# Patient Record
Sex: FEMALE | Race: WHITE | NOT HISPANIC OR LATINO | Employment: UNEMPLOYED | URBAN - METROPOLITAN AREA
[De-identification: names, ages, dates, MRNs, and addresses within clinical notes are randomized per-mention and may not be internally consistent; named-entity substitution may affect disease eponyms.]

---

## 2017-06-15 ENCOUNTER — HISTORICAL (OUTPATIENT)
Dept: ADMINISTRATIVE | Facility: HOSPITAL | Age: 27
End: 2017-06-15

## 2017-06-15 LAB
ABS NEUT (OLG): 6.39 X10(3)/MCL (ref 2.1–9.2)
ALBUMIN SERPL-MCNC: 4.1 GM/DL (ref 3.4–5)
ALBUMIN/GLOB SERPL: 1 {RATIO}
ALP SERPL-CCNC: 69 UNIT/L (ref 20–120)
ALT SERPL-CCNC: 15 UNIT/L
AST SERPL-CCNC: 15 UNIT/L
BASOPHILS # BLD AUTO: 0.06 X10(3)/MCL
BASOPHILS NFR BLD AUTO: 1 % (ref 0–1)
BILIRUB SERPL-MCNC: 0.5 MG/DL
BILIRUBIN DIRECT+TOT PNL SERPL-MCNC: <0.1 MG/DL
BILIRUBIN DIRECT+TOT PNL SERPL-MCNC: >0.4 MG/DL
BUN SERPL-MCNC: 10 MG/DL (ref 7–25)
CALCIUM SERPL-MCNC: 9.1 MG/DL (ref 8.4–10.3)
CHLORIDE SERPL-SCNC: 103 MMOL/L (ref 96–110)
CO2 SERPL-SCNC: 28 MMOL/L (ref 24–32)
CREAT SERPL-MCNC: 0.45 MG/DL (ref 0.7–1.1)
EOSINOPHIL # BLD AUTO: 0.13 X10(3)/MCL
EOSINOPHIL NFR BLD AUTO: 1 % (ref 0–5)
ERYTHROCYTE [DISTWIDTH] IN BLOOD BY AUTOMATED COUNT: 12.6 % (ref 11.5–14.5)
EST. AVERAGE GLUCOSE BLD GHB EST-MCNC: 94 MG/DL
GLOBULIN SER-MCNC: 3.1 GM/ML (ref 2.3–3.5)
GLUCOSE SERPL-MCNC: 86 MG/DL (ref 65–99)
HBA1C MFR BLD: 4.9 % (ref 4.7–5.6)
HCT VFR BLD AUTO: 37.9 % (ref 35–46)
HGB BLD-MCNC: 12.3 GM/DL (ref 12–16)
IMM GRANULOCYTES # BLD AUTO: 0.03 10*3/UL
IMM GRANULOCYTES NFR BLD AUTO: 0 %
LYMPHOCYTES # BLD AUTO: 2.88 X10(3)/MCL
LYMPHOCYTES NFR BLD AUTO: 28 % (ref 15–40)
MCH RBC QN AUTO: 27.1 PG (ref 26–34)
MCHC RBC AUTO-ENTMCNC: 32.5 GM/DL (ref 31–37)
MCV RBC AUTO: 83.5 FL (ref 80–100)
MONOCYTES # BLD AUTO: 0.74 X10(3)/MCL
MONOCYTES NFR BLD AUTO: 7 % (ref 4–12)
NEUTROPHILS # BLD AUTO: 6.39 X10(3)/MCL
NEUTROPHILS NFR BLD AUTO: 62 X10(3)/MCL
PLATELET # BLD AUTO: 362 X10(3)/MCL (ref 130–400)
PMV BLD AUTO: 10.6 FL (ref 7.4–10.4)
POTASSIUM SERPL-SCNC: 4 MMOL/L (ref 3.6–5.2)
PROT SERPL-MCNC: 7.2 GM/DL (ref 6–8)
RBC # BLD AUTO: 4.54 X10(6)/MCL (ref 4–5.2)
SODIUM SERPL-SCNC: 139 MMOL/L (ref 135–146)
T4 FREE SERPL-MCNC: 0.72 NG/DL (ref 0.6–1.15)
TSH SERPL-ACNC: 1.93 MIU/L (ref 0.5–5)
WBC # SPEC AUTO: 10.2 X10(3)/MCL (ref 4.5–11)

## 2017-08-07 LAB — POC BETA-HCG (QUAL): NEGATIVE

## 2017-08-18 ENCOUNTER — HISTORICAL (OUTPATIENT)
Dept: RADIOLOGY | Facility: HOSPITAL | Age: 27
End: 2017-08-18

## 2017-11-02 LAB — POC BETA-HCG (QUAL): NEGATIVE

## 2017-11-09 ENCOUNTER — HISTORICAL (OUTPATIENT)
Dept: ADMINISTRATIVE | Facility: HOSPITAL | Age: 27
End: 2017-11-09

## 2017-11-11 LAB — FINAL CULTURE: NORMAL

## 2017-12-14 LAB — POC BETA-HCG (QUAL): NEGATIVE

## 2019-09-24 LAB — POC BETA-HCG (QUAL): NEGATIVE

## 2019-09-27 ENCOUNTER — HISTORICAL (OUTPATIENT)
Dept: RADIOLOGY | Facility: HOSPITAL | Age: 29
End: 2019-09-27

## 2020-07-02 LAB — POC BETA-HCG (QUAL): NEGATIVE

## 2020-09-30 LAB — POC BETA-HCG (QUAL): NEGATIVE

## 2020-10-07 ENCOUNTER — HISTORICAL (OUTPATIENT)
Dept: RADIOLOGY | Facility: HOSPITAL | Age: 30
End: 2020-10-07

## 2021-06-23 LAB — POC BETA-HCG (QUAL): NEGATIVE

## 2021-10-12 LAB — POC BETA-HCG (QUAL): NEGATIVE

## 2021-10-14 LAB — POC BETA-HCG (QUAL): NEGATIVE

## 2021-11-02 ENCOUNTER — HISTORICAL (OUTPATIENT)
Dept: ADMINISTRATIVE | Facility: HOSPITAL | Age: 31
End: 2021-11-02

## 2021-11-02 LAB
ALBUMIN SERPL-MCNC: 3.6 GM/DL (ref 3.5–5)
ALBUMIN/GLOB SERPL: 1.5 RATIO (ref 1.1–2)
ALP SERPL-CCNC: 67 UNIT/L (ref 40–150)
ALT SERPL-CCNC: 16 UNIT/L (ref 0–55)
AST SERPL-CCNC: 15 UNIT/L (ref 5–34)
B-HCG SERPL QL: NEGATIVE
BILIRUB SERPL-MCNC: 0.5 MG/DL
BILIRUBIN DIRECT+TOT PNL SERPL-MCNC: 0.2 MG/DL (ref 0–0.5)
BILIRUBIN DIRECT+TOT PNL SERPL-MCNC: 0.3 MG/DL (ref 0–0.8)
BUN SERPL-MCNC: 12 MG/DL (ref 7–18.7)
CALCIUM SERPL-MCNC: 8.9 MG/DL (ref 8.7–10.5)
CHLORIDE SERPL-SCNC: 103 MMOL/L (ref 98–107)
CO2 SERPL-SCNC: 27 MMOL/L (ref 22–29)
CREAT SERPL-MCNC: 0.74 MG/DL (ref 0.55–1.02)
ERYTHROCYTE [DISTWIDTH] IN BLOOD BY AUTOMATED COUNT: 12.1 % (ref 11.5–17)
GLOBULIN SER-MCNC: 2.4 GM/DL (ref 2.4–3.5)
GLUCOSE SERPL-MCNC: 80 MG/DL (ref 74–100)
GROUP & RH: NORMAL
HCT VFR BLD AUTO: 38.5 % (ref 37–47)
HGB BLD-MCNC: 12.3 GM/DL (ref 12–16)
MCH RBC QN AUTO: 28.1 PG (ref 27–31)
MCHC RBC AUTO-ENTMCNC: 31.9 GM/DL (ref 33–36)
MCV RBC AUTO: 88.1 FL (ref 80–94)
PLATELET # BLD AUTO: 311 X10(3)/MCL (ref 130–400)
PMV BLD AUTO: 10.4 FL (ref 9.4–12.4)
POTASSIUM SERPL-SCNC: 4.3 MMOL/L (ref 3.5–5.1)
PROT SERPL-MCNC: 6 GM/DL (ref 6.4–8.3)
RBC # BLD AUTO: 4.37 X10(6)/MCL (ref 4.2–5.4)
SARS-COV-2 RNA RESP QL NAA+PROBE: NOT DETECTED
SODIUM SERPL-SCNC: 140 MMOL/L (ref 136–145)
TSH SERPL-ACNC: 1.66 UIU/ML (ref 0.35–4.94)
WBC # SPEC AUTO: 9.7 X10(3)/MCL (ref 4.5–11.5)

## 2021-11-04 ENCOUNTER — HOSPITAL ENCOUNTER (OUTPATIENT)
Dept: MEDSURG UNIT | Facility: HOSPITAL | Age: 31
End: 2021-11-06
Attending: OBSTETRICS & GYNECOLOGY | Admitting: OBSTETRICS & GYNECOLOGY

## 2021-11-05 LAB
HCT VFR BLD AUTO: 34.4 % (ref 37–47)
HGB BLD-MCNC: 10.9 GM/DL (ref 12–16)

## 2022-04-10 ENCOUNTER — HISTORICAL (OUTPATIENT)
Dept: ADMINISTRATIVE | Facility: HOSPITAL | Age: 32
End: 2022-04-10

## 2022-04-27 VITALS
HEIGHT: 62 IN | SYSTOLIC BLOOD PRESSURE: 110 MMHG | DIASTOLIC BLOOD PRESSURE: 69 MMHG | WEIGHT: 140 LBS | BODY MASS INDEX: 25.76 KG/M2

## 2022-04-30 NOTE — OP NOTE
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    OPERATION:  Total abdominal hysterectomy.    ANESTHESIOLOGIST:  Brown Pacheco MD; __________, CRNA.    ESTIMATED BLOOD LOSS:  About 50 cc.    FLUIDS:  Crystalloid 1.5 L.    ANTIBIOTICS:  Ancef 2 g.    PREGNANCY TEST:  Negative.    FINDINGS:  9-week size, smooth, mobile, adenomyosis appearing uterus, normal adnexa bilaterally, freely mobile bowel contents, smooth peritoneal surfaces.  SCDs were placed.    DESCRIPTION OF PROCEDURE:  Risks, benefits, and alternatives to this procedure were explained in detail to Ms Clancy.  She verbalized understanding.  Consents were signed.  She was taken to the operating theater with intravenous fluids running and placed on the operating room table in dorsal supine position where general anesthesia achieved without difficulty.  Jesus catheter placed.  She was prepped and draped in usual sterile fashion.  A Pfannenstiel skin incision made through an old Pfannenstiel scar line, carried down to the underlying layer of fascia sharply.  Fascia scored in the midline.  Fascial incision extended bilaterally sharply.  Superior aspect of the fascial incision grasped with [facility] clamps x2, and underlying rectus muscle dissected off sharply.  Inferior aspect of fascial incision grasped with [facility] clamps x2 and underlying rectus muscles dissected off sharply.  Rectus  in midline.  Parietal peritoneum identified, tented up, was entered sharply extended inferiorly, superiorly.  Patient placed in Trendelenburg position.  O'Skyler-O'Fox retractor placed.  Bowel packed with moist laparotomy sponges.   Uterus grasped at the cornu with [facility] clamps for traction.  Left round ligament was then grasped with Kocher clamps, fulgurated with the Bovie.  Zero Vicryl pop offs were used for all sutures.  It was then suture ligated, hemostasis noted.  In similar fashion, the right round ligament was clamped, fulgurated, and suture ligated.  Hemostasis noted.  A window was then created in the left utero-ovarian ligament through the broad ligament.  The left utero-ovarian ligament was doubly clamped, transected, free tied and suture ligated with 0 Vicryl suture __________ hemostasis noted.  The right utero-ovarian ligament was then doubly clamped, transected, suture ligated, free tied and then suture ligated in a __________ hemostasis noted.  Anterior leaf of broad ligament was brought off the uterus sharply with Metzenbaum scissors.  Uterine arteries were then skeletonized bilaterally, clamped with Demond clamps, transected and suture ligated, hemostasis noted.  I then proceeded to work my way with straight Demond clamps transecting and suture ligated along the way the uterosacral ligaments, followed by the cardinal ligaments.  Upon getting to the level of the external cervical os, came across with curved Demond clamps, removing the uterus and cervix at this time.  The angles of the vagina were incorporated with 0 Vicryl suture into uterosacral ligament pedicles.  The remainder of the vaginal cuff was closed with 0 Vicryl sutures figure-of-eight fashion.  Hemostasis noted.  The pelvis was then irrigated with copious amounts of irrigation.  Utero-ovarian ligament pedicles were tacked to the round ligament pedicles to suspend the ovaries out of the pelvis.  Hemostasis was noted bilaterally, as well as on the vaginal cuff.  We irrigated once more.  Hemostasis noted.  O'Skyler-O'Fox retractor removed, laparotomy sponges were removed.  Bowel returned to its anatomical position.  Rectus muscle reapproximated with 2-0 Vicryl  suture in interrupted fashion.  Fascia reapproximated with #1 Vicryl suture in running fashion.  Hemostasis noted.  Subcu irrigated, reapproximated with 2-0 Vicryl suture in interrupted fashion.  Skin reapproximated with the Insorb subcuticular staple device, followed by Dermabond glue.  Sponge, lap, instrument and needle counts correct x2.  Time was taken to speak with her .        ______________________________  Alec Kapadia MD    VIOLA/  DD:  11/04/2021  Time:  01:33PM  DT:  11/04/2021  Time:  01:51PM  Job #:  [id number]

## 2022-09-15 ENCOUNTER — OFFICE VISIT (OUTPATIENT)
Dept: FAMILY MEDICINE | Facility: CLINIC | Age: 32
End: 2022-09-15
Payer: MEDICAID

## 2022-09-15 VITALS
WEIGHT: 136.5 LBS | BODY MASS INDEX: 25.12 KG/M2 | HEART RATE: 66 BPM | DIASTOLIC BLOOD PRESSURE: 73 MMHG | SYSTOLIC BLOOD PRESSURE: 109 MMHG | RESPIRATION RATE: 20 BRPM | HEIGHT: 62 IN | TEMPERATURE: 98 F | OXYGEN SATURATION: 99 %

## 2022-09-15 DIAGNOSIS — Z00.00 ENCOUNTER FOR WELLNESS EXAMINATION: Primary | ICD-10-CM

## 2022-09-15 DIAGNOSIS — F41.9 ANXIETY: ICD-10-CM

## 2022-09-15 DIAGNOSIS — M54.50 CHRONIC BILATERAL LOW BACK PAIN, UNSPECIFIED WHETHER SCIATICA PRESENT: Chronic | ICD-10-CM

## 2022-09-15 DIAGNOSIS — G89.29 CHRONIC BILATERAL LOW BACK PAIN, UNSPECIFIED WHETHER SCIATICA PRESENT: Chronic | ICD-10-CM

## 2022-09-15 LAB
ALBUMIN SERPL-MCNC: 4.3 GM/DL (ref 3.5–5)
ALBUMIN/GLOB SERPL: 1.6 RATIO (ref 1.1–2)
ALP SERPL-CCNC: 64 UNIT/L (ref 40–150)
ALT SERPL-CCNC: 19 UNIT/L (ref 0–55)
AMORPH URATE CRY URNS QL MICRO: ABNORMAL /UL
APPEARANCE UR: ABNORMAL
AST SERPL-CCNC: 18 UNIT/L (ref 5–34)
BACTERIA #/AREA URNS AUTO: ABNORMAL /HPF
BASOPHILS # BLD AUTO: 0.06 X10(3)/MCL (ref 0–0.2)
BASOPHILS NFR BLD AUTO: 0.9 %
BILIRUB UR QL STRIP.AUTO: NEGATIVE MG/DL
BILIRUBIN DIRECT+TOT PNL SERPL-MCNC: 0.7 MG/DL
BUN SERPL-MCNC: 12.2 MG/DL (ref 7–18.7)
CALCIUM SERPL-MCNC: 9.3 MG/DL (ref 8.4–10.2)
CHLORIDE SERPL-SCNC: 106 MMOL/L (ref 98–107)
CHOLEST SERPL-MCNC: 162 MG/DL
CHOLEST/HDLC SERPL: 4 {RATIO} (ref 0–5)
CO2 SERPL-SCNC: 25 MMOL/L (ref 22–29)
COLOR UR AUTO: YELLOW
CREAT SERPL-MCNC: 0.76 MG/DL (ref 0.55–1.02)
EOSINOPHIL # BLD AUTO: 0.08 X10(3)/MCL (ref 0–0.9)
EOSINOPHIL NFR BLD AUTO: 1.2 %
ERYTHROCYTE [DISTWIDTH] IN BLOOD BY AUTOMATED COUNT: 11.6 % (ref 11.5–17)
EST. AVERAGE GLUCOSE BLD GHB EST-MCNC: 88.2 MG/DL
GFR SERPLBLD CREATININE-BSD FMLA CKD-EPI: >60 MLS/MIN/1.73/M2
GLOBULIN SER-MCNC: 2.7 GM/DL (ref 2.4–3.5)
GLUCOSE SERPL-MCNC: 68 MG/DL (ref 74–100)
GLUCOSE UR QL STRIP.AUTO: NEGATIVE MG/DL
HAV IGM SERPL QL IA: NONREACTIVE
HBA1C MFR BLD: 4.7 %
HBV CORE IGM SERPL QL IA: NONREACTIVE
HBV SURFACE AG SERPL QL IA: NONREACTIVE
HCT VFR BLD AUTO: 43.4 % (ref 37–47)
HCV AB SERPL QL IA: NONREACTIVE
HDLC SERPL-MCNC: 40 MG/DL (ref 35–60)
HGB BLD-MCNC: 14.2 GM/DL (ref 12–16)
HIV 1+2 AB+HIV1 P24 AG SERPL QL IA: NONREACTIVE
IMM GRANULOCYTES # BLD AUTO: 0.01 X10(3)/MCL (ref 0–0.04)
IMM GRANULOCYTES NFR BLD AUTO: 0.1 %
KETONES UR QL STRIP.AUTO: NEGATIVE MG/DL
LDLC SERPL CALC-MCNC: 114 MG/DL (ref 50–140)
LEUKOCYTE ESTERASE UR QL STRIP.AUTO: NEGATIVE UNIT/L
LYMPHOCYTES # BLD AUTO: 2.3 X10(3)/MCL (ref 0.6–4.6)
LYMPHOCYTES NFR BLD AUTO: 33.2 %
MCH RBC QN AUTO: 29.1 PG (ref 27–31)
MCHC RBC AUTO-ENTMCNC: 32.7 MG/DL (ref 33–36)
MCV RBC AUTO: 88.9 FL (ref 80–94)
MONOCYTES # BLD AUTO: 0.55 X10(3)/MCL (ref 0.1–1.3)
MONOCYTES NFR BLD AUTO: 7.9 %
MUCOUS THREADS URNS QL MICRO: ABNORMAL /LPF
NEUTROPHILS # BLD AUTO: 3.9 X10(3)/MCL (ref 2.1–9.2)
NEUTROPHILS NFR BLD AUTO: 56.7 %
NITRITE UR QL STRIP.AUTO: NEGATIVE
NRBC BLD AUTO-RTO: 0 %
PH UR STRIP.AUTO: 6 [PH]
PLATELET # BLD AUTO: 261 X10(3)/MCL (ref 130–400)
PMV BLD AUTO: 10.2 FL (ref 7.4–10.4)
POTASSIUM SERPL-SCNC: 4.9 MMOL/L (ref 3.5–5.1)
PROT SERPL-MCNC: 7 GM/DL (ref 6.4–8.3)
PROT UR QL STRIP.AUTO: ABNORMAL MG/DL
RBC # BLD AUTO: 4.88 X10(6)/MCL (ref 4.2–5.4)
RBC #/AREA URNS AUTO: ABNORMAL /HPF
RBC UR QL AUTO: NEGATIVE UNIT/L
SODIUM SERPL-SCNC: 142 MMOL/L (ref 136–145)
SP GR UR STRIP.AUTO: 1.03
SQUAMOUS #/AREA URNS LPF: ABNORMAL /HPF
T PALLIDUM AB SER QL: NONREACTIVE
T4 FREE SERPL-MCNC: 0.88 NG/DL (ref 0.7–1.48)
TRIGL SERPL-MCNC: 38 MG/DL (ref 37–140)
TSH SERPL-ACNC: 2.54 UIU/ML (ref 0.35–4.94)
UROBILINOGEN UR STRIP-ACNC: NORMAL MG/DL
VLDLC SERPL CALC-MCNC: 8 MG/DL
WBC # SPEC AUTO: 6.9 X10(3)/MCL (ref 4.5–11.5)
WBC #/AREA URNS AUTO: ABNORMAL /HPF

## 2022-09-15 PROCEDURE — 85025 COMPLETE CBC W/AUTO DIFF WBC: CPT

## 2022-09-15 PROCEDURE — 84439 ASSAY OF FREE THYROXINE: CPT

## 2022-09-15 PROCEDURE — 99385 PR PREVENTIVE VISIT,NEW,18-39: ICD-10-PCS | Mod: S$PBB,,,

## 2022-09-15 PROCEDURE — 99214 OFFICE O/P EST MOD 30 MIN: CPT | Mod: PBBFAC,PN

## 2022-09-15 PROCEDURE — 3078F DIAST BP <80 MM HG: CPT | Mod: CPTII,,,

## 2022-09-15 PROCEDURE — 3008F BODY MASS INDEX DOCD: CPT | Mod: CPTII,,,

## 2022-09-15 PROCEDURE — 80074 ACUTE HEPATITIS PANEL: CPT

## 2022-09-15 PROCEDURE — 84443 ASSAY THYROID STIM HORMONE: CPT

## 2022-09-15 PROCEDURE — 3078F PR MOST RECENT DIASTOLIC BLOOD PRESSURE < 80 MM HG: ICD-10-PCS | Mod: CPTII,,,

## 2022-09-15 PROCEDURE — 99214 OFFICE O/P EST MOD 30 MIN: CPT | Mod: 25,S$PBB,,

## 2022-09-15 PROCEDURE — 1159F PR MEDICATION LIST DOCUMENTED IN MEDICAL RECORD: ICD-10-PCS | Mod: CPTII,,,

## 2022-09-15 PROCEDURE — 36415 COLL VENOUS BLD VENIPUNCTURE: CPT

## 2022-09-15 PROCEDURE — 1160F PR REVIEW ALL MEDS BY PRESCRIBER/CLIN PHARMACIST DOCUMENTED: ICD-10-PCS | Mod: CPTII,,,

## 2022-09-15 PROCEDURE — 99385 PREV VISIT NEW AGE 18-39: CPT | Mod: S$PBB,,,

## 2022-09-15 PROCEDURE — 99214 PR OFFICE/OUTPT VISIT, EST, LEVL IV, 30-39 MIN: ICD-10-PCS | Mod: 25,S$PBB,,

## 2022-09-15 PROCEDURE — 1160F RVW MEDS BY RX/DR IN RCRD: CPT | Mod: CPTII,,,

## 2022-09-15 PROCEDURE — 86780 TREPONEMA PALLIDUM: CPT

## 2022-09-15 PROCEDURE — 81001 URINALYSIS AUTO W/SCOPE: CPT

## 2022-09-15 PROCEDURE — 80061 LIPID PANEL: CPT

## 2022-09-15 PROCEDURE — 1159F MED LIST DOCD IN RCRD: CPT | Mod: CPTII,,,

## 2022-09-15 PROCEDURE — 3008F PR BODY MASS INDEX (BMI) DOCUMENTED: ICD-10-PCS | Mod: CPTII,,,

## 2022-09-15 PROCEDURE — 87389 HIV-1 AG W/HIV-1&-2 AB AG IA: CPT

## 2022-09-15 PROCEDURE — 3074F SYST BP LT 130 MM HG: CPT | Mod: CPTII,,,

## 2022-09-15 PROCEDURE — 80053 COMPREHEN METABOLIC PANEL: CPT

## 2022-09-15 PROCEDURE — 3074F PR MOST RECENT SYSTOLIC BLOOD PRESSURE < 130 MM HG: ICD-10-PCS | Mod: CPTII,,,

## 2022-09-15 PROCEDURE — 83036 HEMOGLOBIN GLYCOSYLATED A1C: CPT

## 2022-09-15 RX ORDER — HYDROXYZINE PAMOATE 25 MG/1
25 CAPSULE ORAL EVERY 6 HOURS PRN
Qty: 90 CAPSULE | Refills: 1 | Status: SHIPPED | OUTPATIENT
Start: 2022-09-15 | End: 2022-10-21 | Stop reason: SDUPTHER

## 2022-09-15 NOTE — ASSESSMENT & PLAN NOTE
Given resources to establish care for psychotherapy.   At length discussion about options, patient also be best to trial medication that she can take on as needed based

## 2022-09-15 NOTE — PROGRESS NOTES
Patient Name: Nanci Clancy   : 1990  MRN: [id number]     Subjective:   Patient ID: Nanci Clancy is a 31 y.o. female.    Chief Complaint:   Chief Complaint   Patient presents with    Establish Care     C/O back pain         HPI: 09/15/2022:  Patient here to establish care today, she denies seeing a general practitioner PCP and her daughter alive, she is established with gynecologist who performs her women's wellness for her.  She has follow-up with him beginning of October.  Patient today complains of chronic back pain after accident about 10 years ago, she has tried managing this herself with daily workout routine.  She is requesting further workup to help with her pain in get proper treatment.  Patient denies any bladder or bowel incontinence, saddle anesthesia or tingling of lower extremities.  Patient also complains of increased anxiety and outburst of anger.  Patient states that she is interested in starting psychotherapy to help with these issues but also may need medication to assist.  Patient denies SI/HI.      ROS:  Review of Systems   Constitutional:  Negative for chills, fever and weight loss.   HENT:  Negative for ear discharge, nosebleeds and tinnitus.    Eyes:  Negative for blurred vision, photophobia and pain.   Respiratory:  Negative for cough, shortness of breath, wheezing and stridor.    Cardiovascular:  Negative for chest pain, palpitations and orthopnea.   Gastrointestinal:  Negative for abdominal pain, heartburn and nausea.   Genitourinary:  Negative for dysuria, frequency, hematuria and urgency.   Musculoskeletal:  Positive for back pain. Negative for falls and myalgias.   Skin:  Negative for itching and rash.   Neurological:  Negative for dizziness, sensory change, speech change, focal weakness, seizures, weakness and headaches.   Endo/Heme/Allergies:  Negative for environmental allergies. Does not bruise/bleed easily.   Psychiatric/Behavioral:  Negative for depression,  "hallucinations and suicidal ideas. The patient is nervous/anxious.     History:   History reviewed. No pertinent past medical history.   Past Surgical History:   Procedure Laterality Date    HYSTERECTOMY       History reviewed. No pertinent family history.   Social History     Tobacco Use    Smoking status: Never    Smokeless tobacco: Never   Substance and Sexual Activity    Alcohol use: Not Currently    Drug use: Never    Sexual activity: Not on file        Allergies: Review of patient's allergies indicates:  No Known Allergies  Objective:     Vitals:    09/15/22 0801   BP: 109/73   Pulse: 66   Resp: 20   Temp: 98.4 °F (36.9 °C)   SpO2: 99%   Weight: 61.9 kg (136 lb 8 oz)   Height: 5' 2" (1.575 m)     Body mass index is 24.97 kg/m².     Physical Examination:   Physical Exam  Vitals reviewed.   Constitutional:       Appearance: Normal appearance. She is normal weight.   HENT:      Head: Normocephalic.      Right Ear: Tympanic membrane, ear canal and external ear normal.      Left Ear: Tympanic membrane, ear canal and external ear normal.      Nose: Nose normal.      Mouth/Throat:      Mouth: Mucous membranes are moist.      Pharynx: Oropharynx is clear.   Eyes:      Extraocular Movements: Extraocular movements intact.      Conjunctiva/sclera: Conjunctivae normal.      Pupils: Pupils are equal, round, and reactive to light.   Cardiovascular:      Rate and Rhythm: Normal rate and regular rhythm.      Pulses: Normal pulses.      Heart sounds: Normal heart sounds.   Pulmonary:      Effort: Pulmonary effort is normal.      Breath sounds: Normal breath sounds.   Abdominal:      General: Abdomen is flat. Bowel sounds are normal.      Palpations: Abdomen is soft.   Musculoskeletal:      Cervical back: Normal range of motion and neck supple.      Thoracic back: Spasms present. Decreased range of motion.      Lumbar back: Spasms present. Decreased range of motion.   Skin:     General: Skin is warm and dry.   Neurological:     "  General: No focal deficit present.      Mental Status: She is alert and oriented to person, place, and time.   Psychiatric:         Mood and Affect: Mood normal.         Behavior: Behavior normal.       Assessment:     Problem List Items Addressed This Visit          Psychiatric    Anxiety (Chronic)    Overview     Practice deep breathing or abdominal breathing exercises when anxiety occurs.  Exercise daily. Get sunlight daily.  Avoid caffeine, alcohol and stimulants.  Practice positive phrases and repeat throughout the day, yoga, lavender scents or Chamomile tea will help anxiety.  Set healthy boundaries, avoid people and conversations that increase stress.  Reports any symptoms of suicidal or homicidal ideations immediately, if clinic is closed go to nearest emergency room.             Current Assessment & Plan     Given resources to establish care for psychotherapy.   At length discussion about options, patient also be best to trial medication that she can take on as needed based         Relevant Medications    hydrOXYzine pamoate (VISTARIL) 25 MG Cap       Orthopedic    Chronic low back pain (Chronic)    Overview     Lower back stretches daily.  Avoid strenuous lifting, use proper body mechanics.  Heating pad, Ice pack, Biofreeze or Epsom salt baths as needed.  Exercise to strengthen core muscles to support your back.  PT Referral given.             Relevant Orders    Ambulatory referral/consult to Physical/Occupational Therapy     Other Visit Diagnoses       Encounter for wellness examination    -  Primary    Relevant Orders    TSH    T4, Free    Hemoglobin A1C    SYPHILIS ANTIBODY (WITH REFLEX RPR)    Hepatitis Panel, Acute    Lipid Panel    CBC Auto Differential    Comprehensive Metabolic Panel    HIV 1/2 Ag/Ab (4th Gen)    Urinalysis            Plan:   Nanci was seen today for establish care.    Diagnoses and all orders for this visit:    Encounter for wellness examination  -     TSH  -     T4, Free  -      Hemoglobin A1C  -     SYPHILIS ANTIBODY (WITH REFLEX RPR)  -     Hepatitis Panel, Acute  -     Lipid Panel  -     CBC Auto Differential  -     Comprehensive Metabolic Panel  -     HIV 1/2 Ag/Ab (4th Gen)  -     Urinalysis    Anxiety  -     hydrOXYzine pamoate (VISTARIL) 25 MG Cap; Take 1 capsule (25 mg total) by mouth every 6 (six) hours as needed (anxiety).    Chronic bilateral low back pain, unspecified whether sciatica present  -     Ambulatory referral/consult to Physical/Occupational Therapy; Future     Follow up in about 4 weeks (around 10/13/2022) for Virtual Visit, med change fu, review labs.     This note was created with the assistance of Dragon voice recognition software or phone dictation. There may be transcription errors as a result of using this technology however minimal. Effort has been made to assure accuracy of transcription but any obvious errors or omissions should be clarified with the author of the document

## 2022-09-16 LAB — PATH REV: NORMAL

## 2022-09-21 ENCOUNTER — HISTORICAL (OUTPATIENT)
Dept: ADMINISTRATIVE | Facility: HOSPITAL | Age: 32
End: 2022-09-21
Payer: MEDICAID

## 2022-10-21 ENCOUNTER — OFFICE VISIT (OUTPATIENT)
Dept: FAMILY MEDICINE | Facility: CLINIC | Age: 32
End: 2022-10-21
Payer: MEDICAID

## 2022-10-21 DIAGNOSIS — G89.29 CHRONIC BILATERAL LOW BACK PAIN, UNSPECIFIED WHETHER SCIATICA PRESENT: Chronic | ICD-10-CM

## 2022-10-21 DIAGNOSIS — M54.50 CHRONIC BILATERAL LOW BACK PAIN, UNSPECIFIED WHETHER SCIATICA PRESENT: Chronic | ICD-10-CM

## 2022-10-21 DIAGNOSIS — R14.0 ABDOMINAL BLOATING: ICD-10-CM

## 2022-10-21 DIAGNOSIS — K92.1 BLOOD IN STOOL: ICD-10-CM

## 2022-10-21 DIAGNOSIS — F41.9 ANXIETY: Chronic | ICD-10-CM

## 2022-10-21 DIAGNOSIS — K59.00 CONSTIPATION, UNSPECIFIED CONSTIPATION TYPE: Primary | ICD-10-CM

## 2022-10-21 PROCEDURE — 36415 COLL VENOUS BLD VENIPUNCTURE: CPT

## 2022-10-21 PROCEDURE — 1160F RVW MEDS BY RX/DR IN RCRD: CPT | Mod: CPTII,95,,

## 2022-10-21 PROCEDURE — 1159F MED LIST DOCD IN RCRD: CPT | Mod: CPTII,95,,

## 2022-10-21 PROCEDURE — 99214 OFFICE O/P EST MOD 30 MIN: CPT | Mod: 95,,,

## 2022-10-21 PROCEDURE — 99214 PR OFFICE/OUTPT VISIT, EST, LEVL IV, 30-39 MIN: ICD-10-PCS | Mod: 95,,,

## 2022-10-21 PROCEDURE — 1160F PR REVIEW ALL MEDS BY PRESCRIBER/CLIN PHARMACIST DOCUMENTED: ICD-10-PCS | Mod: CPTII,95,,

## 2022-10-21 PROCEDURE — 1159F PR MEDICATION LIST DOCUMENTED IN MEDICAL RECORD: ICD-10-PCS | Mod: CPTII,95,,

## 2022-10-21 PROCEDURE — 86003 ALLG SPEC IGE CRUDE XTRC EA: CPT

## 2022-10-21 RX ORDER — LIDOCAINE HCL AND HYDROCORTISONE ACETATE 28; 5.5 MG/G; MG/G
1 GEL RECTAL 2 TIMES DAILY PRN
Qty: 100 G | Refills: 0 | Status: SHIPPED | OUTPATIENT
Start: 2022-10-21 | End: 2022-11-02

## 2022-10-21 RX ORDER — DOCUSATE SODIUM 100 MG/1
100 CAPSULE, LIQUID FILLED ORAL 2 TIMES DAILY
Qty: 60 CAPSULE | Refills: 0 | Status: SHIPPED | OUTPATIENT
Start: 2022-10-21 | End: 2023-06-13

## 2022-10-21 RX ORDER — HYDROXYZINE PAMOATE 25 MG/1
25 CAPSULE ORAL EVERY 6 HOURS PRN
Qty: 90 CAPSULE | Refills: 1 | Status: SHIPPED | OUTPATIENT
Start: 2022-10-21 | End: 2022-11-07 | Stop reason: SDUPTHER

## 2022-10-21 NOTE — PROGRESS NOTES
Audio Only Telehealth Visit     The patient location is: [place]  The chief complaint leading to consultation is: review labs, abdominal bloating  Visit type: Virtual visit with audio only (telephone)  Total time spent with patient: 20 min     The reason for the audio only service rather than synchronous audio and video virtual visit was related to technical difficulties or patient preference/necessity.     Each patient to whom I provide medical services by telemedicine is:  (1) informed of the relationship between the physician and patient and the respective role of any other health care provider with respect to management of the patient; and (2) notified that they may decline to receive medical services by telemedicine and may withdraw from such care at any time. Patient verbally consented to receive this service via voice-only telephone call.    Patient Name: Nanci Clancy   : 1990  MRN: [id number]     Subjective:   Patient ID: Nanci Clancy is a 32 y.o. female.    Chief Complaint: No chief complaint on file.       HPI: 10/21/2022:  Virtual visit today with patient to review labs, patient complaining additionally of abdominal bloating, occasional blood in stool. Patient states she has been eating healthy and only eats whole foods. She has had hemorrhoids in the past but does not notice any currently. Has annual appointment with gyn next week. Her Anxiety is still present but she has notice improvement in mood and anxiety since starting vistaril. Notes improved sleep.     09/15/2022:  Patient here to establish care today, she denies seeing a general practitioner PCP and her daughter alive, she is established with gynecologist who performs her women's wellness for her.  She has follow-up with him beginning of October.  Patient today complains of chronic back pain after accident about 10 years ago, she has tried managing this herself with daily workout routine.  She is requesting further workup  to help with her pain in get proper treatment.  Patient denies any bladder or bowel incontinence, saddle anesthesia or tingling of lower extremities.  Patient also complains of increased anxiety and outburst of anger.  Patient states that she is interested in starting psychotherapy to help with these issues but also may need medication to assist.  Patient denies SI/HI.      ROS:  Review of Systems   HENT:  Negative for hearing loss.    Eyes:  Negative for discharge.   Respiratory:  Negative for wheezing.    Cardiovascular:  Negative for chest pain and palpitations.   Gastrointestinal:  Positive for blood in stool, constipation and diarrhea. Negative for vomiting.   Genitourinary:  Negative for dysuria and hematuria.   Musculoskeletal:  Negative for neck pain.   Neurological:  Negative for weakness and headaches.   Endo/Heme/Allergies:  Negative for polydipsia.    History:   History reviewed. No pertinent past medical history.   Past Surgical History:   Procedure Laterality Date    HYSTERECTOMY       History reviewed. No pertinent family history.   Social History     Tobacco Use    Smoking status: Never    Smokeless tobacco: Never   Substance and Sexual Activity    Alcohol use: Not Currently    Drug use: Never    Sexual activity: Not on file        Allergies: Review of patient's allergies indicates:  No Known Allergies  Objective:   There were no vitals filed for this visit.  There is no height or weight on file to calculate BMI.     Physical Examination:   Physical Exam  Constitutional:       General: She is not in acute distress.     Comments: Limited Physical Exam due to telemedicine visit   Pulmonary:      Effort: Pulmonary effort is normal. No respiratory distress.   Neurological:      Mental Status: She is alert.   Psychiatric:         Mood and Affect: Mood normal.         Behavior: Behavior normal.       Assessment:     Problem List Items Addressed This Visit          Psychiatric    Anxiety (Chronic)     Overview     Practice deep breathing or abdominal breathing exercises when anxiety occurs.  Exercise daily. Get sunlight daily.  Avoid caffeine, alcohol and stimulants.  Practice positive phrases and repeat throughout the day, yoga, lavender scents or Chamomile tea will help anxiety.  Set healthy boundaries, avoid people and conversations that increase stress.  Reports any symptoms of suicidal or homicidal ideations immediately, if clinic is closed go to nearest emergency room.             Current Assessment & Plan     Continue Vistaril, patient states that she is noticing some improvement would like to try it for another month before making any changes to her medications.         Relevant Medications    hydrOXYzine pamoate (VISTARIL) 25 MG Cap       Orthopedic    Chronic low back pain (Chronic)    Overview     Lower back stretches daily.  Avoid strenuous lifting, use proper body mechanics.  Heating pad, Ice pack, Biofreeze or Epsom salt baths as needed.  Exercise to strengthen core muscles to support your back.  PT Referral given.             Current Assessment & Plan     Started with PT and new . Is starting to notice          Other Visit Diagnoses       Constipation, unspecified constipation type    -  Primary    Relevant Medications    docusate sodium (COLACE) 100 MG capsule    Other Relevant Orders    Gluten, IgG    Ambulatory referral/consult to Gastroenterology    Abdominal bloating        food allergen panel ordered    Relevant Orders    Gluten, IgG    Ambulatory referral/consult to Gastroenterology    Milk IgE    Food Allergy Panel    Blood in stool        Refer to Gastro, occult blood ordered.  Will do medications for hemorrhoids to rule out status probable cause.    Relevant Medications    LIDOcaine-hydrocortisone-aloe 2.8-0.55 % Gel    docusate sodium (COLACE) 100 MG capsule    Other Relevant Orders    OCCULT BLOOD FECAL IMMUNOASSAY            Plan:   Diagnoses and all orders for this  visit:    Constipation, unspecified constipation type  -     Gluten, IgG; Future  -     Ambulatory referral/consult to Gastroenterology; Future  -     docusate sodium (COLACE) 100 MG capsule; Take 1 capsule (100 mg total) by mouth 2 (two) times daily.    Abdominal bloating  Comments:  food allergen panel ordered  Orders:  -     Gluten, IgG; Future  -     Ambulatory referral/consult to Gastroenterology; Future  -     Milk IgE; Future  -     Food Allergy Panel; Future    Blood in stool  Comments:  Refer to Gastro, occult blood ordered.  Will do medications for hemorrhoids to rule out status probable cause.  Orders:  -     OCCULT BLOOD FECAL IMMUNOASSAY; Future  -     LIDOcaine-hydrocortisone-aloe 2.8-0.55 % Gel; Place 1 each rectally 2 (two) times daily as needed (pain).  -     docusate sodium (COLACE) 100 MG capsule; Take 1 capsule (100 mg total) by mouth 2 (two) times daily.  -     OCCULT BLOOD FECAL IMMUNOASSAY    Anxiety  -     hydrOXYzine pamoate (VISTARIL) 25 MG Cap; Take 1 capsule (25 mg total) by mouth every 6 (six) hours as needed (anxiety).    Chronic bilateral low back pain, unspecified whether sciatica present     Follow up in about 4 weeks (around 11/18/2022) for Virtual Visit, med change fu, AGATA/PHQ, review labs.       This note was created with the assistance of a voice recognition software or phone dictation. There may be transcription errors as a result of using this technology however minimal. Effort has been made to assure accuracy of transcription but any obvious errors or omissions should be clarified with the author of the document      This service was not originating from a related E/M service provided within the previous 7 days nor will  to an E/M service or procedure within the next 24 hours or my soonest available appointment.  Prevailing standard of care was able to be met in this audio-only visit.   Answers submitted by the patient for this visit:  Review of Systems Questionnaire  (Submitted on 10/21/2022)  activity change: No  unexpected weight change: No  rhinorrhea: No  trouble swallowing: No  visual disturbance: No  chest tightness: No  polyuria: No  difficulty urinating: No  menstrual problem: No  joint swelling: No  arthralgias: No  confusion: No  dysphoric mood: No

## 2022-10-21 NOTE — ASSESSMENT & PLAN NOTE
Continue Vistaril, patient states that she is noticing some improvement would like to try it for another month before making any changes to her medications.

## 2022-10-25 LAB — FOOD ALLERG MIX5 IGE QN: <0.35 KU/L

## 2022-10-28 LAB — ALLERGEN MILK IGE (OLG): <0.1

## 2022-11-07 DIAGNOSIS — F41.9 ANXIETY: Chronic | ICD-10-CM

## 2022-11-07 RX ORDER — HYDROXYZINE PAMOATE 25 MG/1
25 CAPSULE ORAL EVERY 6 HOURS PRN
Qty: 90 CAPSULE | Refills: 1 | Status: SHIPPED | OUTPATIENT
Start: 2022-11-07 | End: 2022-12-29 | Stop reason: SDUPTHER

## 2022-12-13 DIAGNOSIS — K21.9 ESOPHAGEAL REFLUX: Primary | ICD-10-CM

## 2022-12-20 ENCOUNTER — HOSPITAL ENCOUNTER (OUTPATIENT)
Dept: RADIOLOGY | Facility: HOSPITAL | Age: 32
Discharge: HOME | End: 2022-12-20
Attending: REGISTERED NURSE
Payer: MEDICAID

## 2022-12-20 ENCOUNTER — LAB VISIT (OUTPATIENT)
Dept: LAB | Facility: HOSPITAL | Age: 32
End: 2022-12-20
Attending: REGISTERED NURSE
Payer: MEDICAID

## 2022-12-20 DIAGNOSIS — K59.00 CONSTIPATION, UNSPECIFIED CONSTIPATION TYPE: ICD-10-CM

## 2022-12-20 DIAGNOSIS — R14.3 FLATULENCE, ERUCTATION, AND GAS PAIN: ICD-10-CM

## 2022-12-20 DIAGNOSIS — K21.9 ESOPHAGEAL REFLUX: ICD-10-CM

## 2022-12-20 DIAGNOSIS — R14.2 FLATULENCE, ERUCTATION, AND GAS PAIN: ICD-10-CM

## 2022-12-20 DIAGNOSIS — R14.1 FLATULENCE, ERUCTATION, AND GAS PAIN: ICD-10-CM

## 2022-12-20 DIAGNOSIS — K62.5 RECTAL BLEED: ICD-10-CM

## 2022-12-20 DIAGNOSIS — R11.0 NAUSEA: ICD-10-CM

## 2022-12-20 DIAGNOSIS — K21.9 GASTROESOPHAGEAL REFLUX DISEASE, UNSPECIFIED WHETHER ESOPHAGITIS PRESENT: Primary | ICD-10-CM

## 2022-12-20 DIAGNOSIS — R10.13 EPIGASTRIC PAIN: ICD-10-CM

## 2022-12-20 DIAGNOSIS — K62.89 ANAL OR RECTAL PAIN: ICD-10-CM

## 2022-12-20 LAB — TSH SERPL-ACNC: 2 UIU/ML (ref 0.35–4.94)

## 2022-12-20 PROCEDURE — 36415 COLL VENOUS BLD VENIPUNCTURE: CPT

## 2022-12-20 PROCEDURE — 76705 ECHO EXAM OF ABDOMEN: CPT | Mod: TC

## 2022-12-20 PROCEDURE — 84443 ASSAY THYROID STIM HORMONE: CPT

## 2022-12-20 PROCEDURE — 86364 TISS TRNSGLTMNASE EA IG CLAS: CPT

## 2022-12-20 PROCEDURE — 86231 EMA EACH IG CLASS: CPT

## 2022-12-21 LAB — TTG IGA SER IA-ACNC: <1.2 U/ML

## 2022-12-23 LAB — ENDOMYSIUM IGA SER QL IF: NEGATIVE

## 2022-12-29 DIAGNOSIS — F41.9 ANXIETY: Chronic | ICD-10-CM

## 2022-12-29 RX ORDER — HYDROXYZINE PAMOATE 25 MG/1
25 CAPSULE ORAL EVERY 6 HOURS PRN
Qty: 90 CAPSULE | Refills: 1 | Status: SHIPPED | OUTPATIENT
Start: 2022-12-29 | End: 2023-01-19 | Stop reason: SDUPTHER

## 2023-01-19 ENCOUNTER — OFFICE VISIT (OUTPATIENT)
Dept: FAMILY MEDICINE | Facility: CLINIC | Age: 33
End: 2023-01-19
Payer: MEDICAID

## 2023-01-19 DIAGNOSIS — K92.1 BLOOD IN STOOL: Primary | ICD-10-CM

## 2023-01-19 DIAGNOSIS — F41.9 ANXIETY: Chronic | ICD-10-CM

## 2023-01-19 PROCEDURE — 99213 OFFICE O/P EST LOW 20 MIN: CPT | Mod: 95,,,

## 2023-01-19 PROCEDURE — 99213 PR OFFICE/OUTPT VISIT, EST, LEVL III, 20-29 MIN: ICD-10-PCS | Mod: 95,,,

## 2023-01-19 PROCEDURE — 1159F MED LIST DOCD IN RCRD: CPT | Mod: CPTII,95,,

## 2023-01-19 PROCEDURE — 1159F PR MEDICATION LIST DOCUMENTED IN MEDICAL RECORD: ICD-10-PCS | Mod: CPTII,95,,

## 2023-01-19 PROCEDURE — 1160F PR REVIEW ALL MEDS BY PRESCRIBER/CLIN PHARMACIST DOCUMENTED: ICD-10-PCS | Mod: CPTII,95,,

## 2023-01-19 PROCEDURE — 1160F RVW MEDS BY RX/DR IN RCRD: CPT | Mod: CPTII,95,,

## 2023-01-19 RX ORDER — FAMOTIDINE 40 MG/1
40 TABLET, FILM COATED ORAL NIGHTLY
COMMUNITY
Start: 2022-12-12

## 2023-01-19 RX ORDER — OSELTAMIVIR PHOSPHATE 75 MG/1
75 CAPSULE ORAL EVERY 12 HOURS
COMMUNITY
Start: 2023-01-16 | End: 2023-06-13

## 2023-01-19 RX ORDER — PANTOPRAZOLE SODIUM 40 MG/1
40 TABLET, DELAYED RELEASE ORAL EVERY MORNING
COMMUNITY
Start: 2023-01-11

## 2023-01-19 RX ORDER — HYDROXYZINE PAMOATE 25 MG/1
25 CAPSULE ORAL EVERY 6 HOURS PRN
Qty: 90 CAPSULE | Refills: 1 | Status: SHIPPED | OUTPATIENT
Start: 2023-01-19 | End: 2023-08-01

## 2023-01-19 RX ORDER — PLECANATIDE 3 MG/1
1 TABLET ORAL
COMMUNITY
Start: 2022-12-12 | End: 2023-08-01 | Stop reason: ALTCHOICE

## 2023-01-19 RX ORDER — LINACLOTIDE 72 UG/1
72 CAPSULE, GELATIN COATED ORAL 3 TIMES DAILY
COMMUNITY
Start: 2022-12-21 | End: 2023-08-01

## 2023-01-19 RX ORDER — SODIUM PICOSULFATE, MAGNESIUM OXIDE, AND ANHYDROUS CITRIC ACID 10; 3.5; 12 MG/160ML; G/160ML; G/160ML
LIQUID ORAL
COMMUNITY
Start: 2022-12-12 | End: 2023-08-01

## 2023-01-19 NOTE — PROGRESS NOTES
Audio Only Telehealth Visit     The patient location is: [place]   The chief complaint leading to consultation is: anxiety, blood in stool  Visit type: Virtual visit with audio only (telephone)  Total time spent with patient: 25 min     The reason for the audio only service rather than synchronous audio and video virtual visit was related to technical difficulties or patient preference/necessity.     Each patient to whom I provide medical services by telemedicine is:  (1) informed of the relationship between the physician and patient and the respective role of any other health care provider with respect to management of the patient; and (2) notified that they may decline to receive medical services by telemedicine and may withdraw from such care at any time. Patient verbally consented to receive this service via voice-only telephone call.    Patient Name: Nanci Clancy   : 1990  MRN: [id number]     Subjective:   Patient ID: Nanci Clancy is a 32 y.o. female.    Chief Complaint:   Chief Complaint   Patient presents with    Follow-up        HPI: 2023:  Virtual visit with patient today to discuss abdominal ultrasound was performed, patient has also establish care with gastroenterologist.  Patient states that she will be having a colonoscopy and endoscopy performed.  Patient is still having blood in her stool.  Patient states that she has noticed improvement in her anxiety since utilizing Vistaril more often.  She is satisfied with this medication currently would not like to make any changes or use a preventative medicine at this time.    10/21/2022:  Virtual visit today with patient to review labs, patient complaining additionally of abdominal bloating, occasional blood in stool. Patient states she has been eating healthy and only eats whole foods. She has had hemorrhoids in the past but does not notice any currently. Has annual appointment with gyn next week. Her Anxiety is still  present but she has notice improvement in mood and anxiety since starting vistaril. Notes improved sleep.     09/15/2022:  Patient here to establish care today, she denies seeing a general practitioner PCP and her daughter alive, she is established with gynecologist who performs her women's wellness for her.  She has follow-up with him beginning of October.  Patient today complains of chronic back pain after accident about 10 years ago, she has tried managing this herself with daily workout routine.  She is requesting further workup to help with her pain in get proper treatment.  Patient denies any bladder or bowel incontinence, saddle anesthesia or tingling of lower extremities.  Patient also complains of increased anxiety and outburst of anger.  Patient states that she is interested in starting psychotherapy to help with these issues but also may need medication to assist.  Patient denies SI/HI.      ROS:  Review of Systems   HENT:  Negative for hearing loss.    Eyes:  Negative for discharge.   Respiratory:  Negative for wheezing.    Cardiovascular:  Negative for chest pain and palpitations.   Gastrointestinal:  Positive for blood in stool. Negative for constipation, diarrhea and vomiting.   Genitourinary:  Negative for dysuria and hematuria.   Musculoskeletal:  Negative for neck pain.   Neurological:  Negative for weakness and headaches.   Endo/Heme/Allergies:  Negative for polydipsia.    History:   History reviewed. No pertinent past medical history.   Past Surgical History:   Procedure Laterality Date    HYSTERECTOMY      PARTIAL HYSTERECTOMY       History reviewed. No pertinent family history.   Social History     Tobacco Use    Smoking status: Never    Smokeless tobacco: Never   Substance and Sexual Activity    Alcohol use: Not Currently    Drug use: Never    Sexual activity: Not on file        Allergies: Review of patient's allergies indicates:  No Known Allergies  Objective:   There were no vitals filed for  this visit.  There is no height or weight on file to calculate BMI.     Physical Examination:   Physical Exam  Constitutional:       General: She is not in acute distress.     Comments: Limited Physical Exam due to telemedicine visit   Pulmonary:      Effort: Pulmonary effort is normal. No respiratory distress.   Neurological:      Mental Status: She is alert.   Psychiatric:         Mood and Affect: Mood normal.         Behavior: Behavior normal.       Assessment:     Problem List Items Addressed This Visit          Psychiatric    Anxiety (Chronic)    Overview     Practice deep breathing or abdominal breathing exercises when anxiety occurs.  Exercise daily. Get sunlight daily.  Avoid caffeine, alcohol and stimulants.  Practice positive phrases and repeat throughout the day, yoga, lavender scents or Chamomile tea will help anxiety.  Set healthy boundaries, avoid people and conversations that increase stress.  Reports any symptoms of suicidal or homicidal ideations immediately, if clinic is closed go to nearest emergency room.             Current Assessment & Plan     Chronic issue, continue Vistaril p.r.n.         Relevant Medications    hydrOXYzine pamoate (VISTARIL) 25 MG Cap     Other Visit Diagnoses       Blood in stool    -  Primary    Keep appointments with gastroenterologist, follow plan of care as established by that provider.  Patient will be having colonoscopy and endoscopy performed            Plan:   Nanci was seen today for follow-up.    Diagnoses and all orders for this visit:    Blood in stool  Comments:  Keep appointments with gastroenterologist, follow plan of care as established by that provider.  Patient will be having colonoscopy and endoscopy performed    Anxiety  -     hydrOXYzine pamoate (VISTARIL) 25 MG Cap; Take 1 capsule (25 mg total) by mouth every 6 (six) hours as needed (anxiety).       Follow up in about 3 months (around 4/19/2023) for routine labs recheck, AGATA/PHQ.       This note  was created with the assistance of a voice recognition software or phone dictation. There may be transcription errors as a result of using this technology however minimal. Effort has been made to assure accuracy of transcription but any obvious errors or omissions should be clarified with the author of the document      This service was not originating from a related E/M service provided within the previous 7 days nor will  to an E/M service or procedure within the next 24 hours or my soonest available appointment.  Prevailing standard of care was able to be met in this audio-only visit.

## 2023-02-08 ENCOUNTER — OFFICE VISIT (OUTPATIENT)
Dept: FAMILY MEDICINE | Facility: CLINIC | Age: 33
End: 2023-02-08
Payer: MEDICAID

## 2023-02-08 DIAGNOSIS — R41.840 INATTENTION: Primary | ICD-10-CM

## 2023-02-08 DIAGNOSIS — F41.9 ANXIETY: Chronic | ICD-10-CM

## 2023-02-08 PROCEDURE — 1160F PR REVIEW ALL MEDS BY PRESCRIBER/CLIN PHARMACIST DOCUMENTED: ICD-10-PCS | Mod: CPTII,95,,

## 2023-02-08 PROCEDURE — 99214 PR OFFICE/OUTPT VISIT, EST, LEVL IV, 30-39 MIN: ICD-10-PCS | Mod: 95,,,

## 2023-02-08 PROCEDURE — 1159F MED LIST DOCD IN RCRD: CPT | Mod: CPTII,95,,

## 2023-02-08 PROCEDURE — 1160F RVW MEDS BY RX/DR IN RCRD: CPT | Mod: CPTII,95,,

## 2023-02-08 PROCEDURE — 1159F PR MEDICATION LIST DOCUMENTED IN MEDICAL RECORD: ICD-10-PCS | Mod: CPTII,95,,

## 2023-02-08 PROCEDURE — 99214 OFFICE O/P EST MOD 30 MIN: CPT | Mod: 95,,,

## 2023-02-08 RX ORDER — BUSPIRONE HYDROCHLORIDE 10 MG/1
10 TABLET ORAL 2 TIMES DAILY
Qty: 60 TABLET | Refills: 0 | Status: SHIPPED | OUTPATIENT
Start: 2023-02-08 | End: 2023-06-13

## 2023-02-08 NOTE — ASSESSMENT & PLAN NOTE
Chronic issue, worsening.  We will start patient BuSpar 10 mg b.i.d.    Started on medication at office visit today, medication side effects, risks and goals discussed with patient. All questions asked  and answered.  Patient given enough medication, with refills as necessary, to last until patient's follow-up visit.

## 2023-02-08 NOTE — PROGRESS NOTES
Audio Only Telehealth Visit     The patient location is: [place]  The chief complaint leading to consultation is: worsening focus and anxiety.  Visit type: Virtual visit with audio only (telephone)  Total time spent with patient: 25 min     The reason for the audio only service rather than synchronous audio and video virtual visit was related to technical difficulties or patient preference/necessity.     Each patient to whom I provide medical services by telemedicine is:  (1) informed of the relationship between the physician and patient and the respective role of any other health care provider with respect to management of the patient; and (2) notified that they may decline to receive medical services by telemedicine and may withdraw from such care at any time. Patient verbally consented to receive this service via voice-only telephone call.    Patient Name: Nanci Clancy   : 1990  MRN: [id number]     Subjective:   Patient ID: Nanci Clancy is a 32 y.o. female.    Chief Complaint:   Chief Complaint   Patient presents with    Follow-up     Telemed, C/O         HPI: 2023:  Patient made this appointment today to discuss increased fatigue and inattention since utilizing Vistaril for anxiety.  Patient states that she noticed that she is having to use this medication more throughout the day and endorses increase in her anxiety.  Patient is amenable to starting daily medication and attempt to prevent anxiety from occurring.  At length discussion with patient about medication regimen      ROS:  Review of Systems   HENT:  Negative for hearing loss.    Eyes:  Negative for discharge.   Respiratory:  Negative for wheezing.    Cardiovascular:  Negative for chest pain and palpitations.   Gastrointestinal:  Negative for blood in stool, constipation, diarrhea and vomiting.   Genitourinary:  Negative for dysuria and hematuria.   Musculoskeletal:  Negative for neck pain.   Neurological:  Negative  for weakness and headaches.   Endo/Heme/Allergies:  Negative for polydipsia.   Psychiatric/Behavioral:  Negative for suicidal ideas. The patient is nervous/anxious.     History:   History reviewed. No pertinent past medical history.   Past Surgical History:   Procedure Laterality Date    HYSTERECTOMY      PARTIAL HYSTERECTOMY       History reviewed. No pertinent family history.   Social History     Tobacco Use    Smoking status: Never    Smokeless tobacco: Never   Substance and Sexual Activity    Alcohol use: Not Currently    Drug use: Never    Sexual activity: Not on file        Allergies: Review of patient's allergies indicates:  No Known Allergies  Objective:   There were no vitals filed for this visit.  There is no height or weight on file to calculate BMI.     Physical Examination:   Physical Exam  Constitutional:       General: She is not in acute distress.     Comments: Limited Physical Exam due to telemedicine visit   Pulmonary:      Effort: Pulmonary effort is normal. No respiratory distress.   Neurological:      Mental Status: She is alert.   Psychiatric:         Mood and Affect: Mood normal.         Behavior: Behavior normal.       Assessment:     Problem List Items Addressed This Visit          Psychiatric    Anxiety (Chronic)    Overview     Practice deep breathing or abdominal breathing exercises when anxiety occurs.  Exercise daily. Get sunlight daily.  Avoid caffeine, alcohol and stimulants.  Practice positive phrases and repeat throughout the day, yoga, lavender scents or Chamomile tea will help anxiety.  Set healthy boundaries, avoid people and conversations that increase stress.  Reports any symptoms of suicidal or homicidal ideations immediately, if clinic is closed go to nearest emergency room.             Current Assessment & Plan     Chronic issue, worsening.  We will start patient BuSpar 10 mg b.i.d.    Started on medication at office visit today, medication side effects, risks and goals  discussed with patient. All questions asked  and answered.  Patient given enough medication, with refills as necessary, to last until patient's follow-up visit.            Relevant Medications    busPIRone (BUSPAR) 10 MG tablet    Other Relevant Orders    Ambulatory referral/consult to Behavioral Health     Other Visit Diagnoses       Inattention    -  Primary    referral to psych for further eval, discussed this may be symptom of anxiety.     Relevant Orders    Ambulatory referral/consult to Behavioral Health            Plan:   Nanci was seen today for follow-up.    Diagnoses and all orders for this visit:    Inattention  Comments:  referral to psych for further eval, discussed this may be symptom of anxiety.   Orders:  -     Ambulatory referral/consult to Behavioral Health; Future    Anxiety  -     busPIRone (BUSPAR) 10 MG tablet; Take 1 tablet (10 mg total) by mouth 2 (two) times daily.  -     Ambulatory referral/consult to Behavioral Health; Future       Follow up in about 4 weeks (around 3/8/2023) for med change fu, AGATA/PHQ, Virtual Visit.       This note was created with the assistance of a voice recognition software or phone dictation. There may be transcription errors as a result of using this technology however minimal. Effort has been made to assure accuracy of transcription but any obvious errors or omissions should be clarified with the author of the document      This service was not originating from a related E/M service provided within the previous 7 days nor will  to an E/M service or procedure within the next 24 hours or my soonest available appointment.  Prevailing standard of care was able to be met in this audio-only visit.

## 2023-04-13 ENCOUNTER — OFFICE VISIT (OUTPATIENT)
Dept: FAMILY MEDICINE | Facility: CLINIC | Age: 33
End: 2023-04-13
Payer: MEDICAID

## 2023-04-13 DIAGNOSIS — F41.9 ANXIETY: Chronic | ICD-10-CM

## 2023-04-13 PROCEDURE — 1159F PR MEDICATION LIST DOCUMENTED IN MEDICAL RECORD: ICD-10-PCS | Mod: CPTII,95,,

## 2023-04-13 PROCEDURE — 1160F PR REVIEW ALL MEDS BY PRESCRIBER/CLIN PHARMACIST DOCUMENTED: ICD-10-PCS | Mod: CPTII,95,,

## 2023-04-13 PROCEDURE — 99213 PR OFFICE/OUTPT VISIT, EST, LEVL III, 20-29 MIN: ICD-10-PCS | Mod: 95,,,

## 2023-04-13 PROCEDURE — 1160F RVW MEDS BY RX/DR IN RCRD: CPT | Mod: CPTII,95,,

## 2023-04-13 PROCEDURE — 1159F MED LIST DOCD IN RCRD: CPT | Mod: CPTII,95,,

## 2023-04-13 PROCEDURE — 99213 OFFICE O/P EST LOW 20 MIN: CPT | Mod: 95,,,

## 2023-04-13 NOTE — ASSESSMENT & PLAN NOTE
Chronic, slightly stable issue.  Patient would not like to make any changes to her current medication regimen as she does not want to add any additional medications.  Currently she is using all anxiety medicine as a p.r.n. medication.  States that this is working adequately for her and will continue to do this while she waits for her appointment to establish care with psychiatrist on May 30th.

## 2023-04-13 NOTE — PROGRESS NOTES
"Audio/visual Telehealth Visit     The patient location is:  [place]  The chief complaint leading to consultation is: anxiety, focus  Visit type:  Synchronous audio visual  Total time spent with patient: 20 min     Each patient to whom I provide medical services by telemedicine is:  (1) informed of the relationship between the physician and patient and the respective role of any other health care provider with respect to management of the patient; and (2) notified that they may decline to receive medical services by telemedicine and may withdraw from such care at any time. Patient verbally consented to receive this service via audio/visual call.      Patient Name: Nanci Clancy   : 1990  MRN: [id number]     Subjective:   Patient ID: Nanci Clancy is a 32 y.o. female.    Chief Complaint: No chief complaint on file.       HPI: 2023:  Will visit with patient today to discuss anxiety, increasing inattention as well as follow-up previous gastroenterologist complaints.  Patient states that she has recently started Linzess and has noticed a drastic improvement in her constipation.  Patient additionally states that she has been using the Vistaril again for as-needed anxiety in addition to the BusPar and states that her anxiety is "okay".  States that she has a lot of external stressors with her kids being home for spring break currently as well as she is currently enrolled in school to become a .  Patient does not know if it is her anxiety or if she is ADHD but states that she has difficult time focusing in his often having to reread the same topics in his forgetful to things that were just told to her.  Notes that  points out that she can not remember simple conversations that they have.  Patient would like to wait until she establishes care with the psychiatrist on May 30th because she does not like taking lots of medication.  She is been trying to manage stress and " anxiety with working out as well as diet.    02/08/2023:  Patient made this appointment today to discuss increased fatigue and inattention since utilizing Vistaril for anxiety.  Patient states that she noticed that she is having to use this medication more throughout the day and endorses increase in her anxiety.  Patient is amenable to starting daily medication and attempt to prevent anxiety from occurring.  At length discussion with patient about medication regimen    01/19/2023:  Virtual visit with patient today to discuss abdominal ultrasound was performed, patient has also establish care with gastroenterologist.  Patient states that she will be having a colonoscopy and endoscopy performed.  Patient is still having blood in her stool.  Patient states that she has noticed improvement in her anxiety since utilizing Vistaril more often.  She is satisfied with this medication currently would not like to make any changes or use a preventative medicine at this time.    10/21/2022:  Virtual visit today with patient to review labs, patient complaining additionally of abdominal bloating, occasional blood in stool. Patient states she has been eating healthy and only eats whole foods. She has had hemorrhoids in the past but does not notice any currently. Has annual appointment with gyn next week. Her Anxiety is still present but she has notice improvement in mood and anxiety since starting vistaril. Notes improved sleep.     09/15/2022:  Patient here to establish care today, she denies seeing a general practitioner PCP and her daughter alive, she is established with gynecologist who performs her women's wellness for her.  She has follow-up with him beginning of October.  Patient today complains of chronic back pain after accident about 10 years ago, she has tried managing this herself with daily workout routine.  She is requesting further workup to help with her pain in get proper treatment.  Patient denies any bladder or  bowel incontinence, saddle anesthesia or tingling of lower extremities.  Patient also complains of increased anxiety and outburst of anger.  Patient states that she is interested in starting psychotherapy to help with these issues but also may need medication to assist.  Patient denies SI/HI.      ROS:  Review of Systems   HENT:  Negative for hearing loss.    Eyes:  Negative for discharge.   Respiratory:  Negative for wheezing.    Cardiovascular:  Negative for chest pain and palpitations.   Gastrointestinal:  Negative for blood in stool, constipation, diarrhea and vomiting.   Genitourinary:  Negative for dysuria and hematuria.   Musculoskeletal:  Negative for neck pain.   Neurological:  Positive for headaches. Negative for weakness.   Endo/Heme/Allergies:  Negative for polydipsia.   Psychiatric/Behavioral:  Negative for suicidal ideas.     History:   No past medical history on file.   Past Surgical History:   Procedure Laterality Date    HYSTERECTOMY      PARTIAL HYSTERECTOMY       No family history on file.   Social History     Tobacco Use    Smoking status: Never    Smokeless tobacco: Never   Substance and Sexual Activity    Alcohol use: Not Currently    Drug use: Never    Sexual activity: Not on file        Allergies: Review of patient's allergies indicates:  No Known Allergies  Objective:   There were no vitals filed for this visit.  There is no height or weight on file to calculate BMI.     Physical Examination:   Physical Exam  Constitutional:       General: She is not in acute distress.     Comments: Limited Physical Exam due to telemedicine visit   Pulmonary:      Effort: Pulmonary effort is normal. No respiratory distress.   Neurological:      Mental Status: She is alert.   Psychiatric:         Mood and Affect: Mood normal.         Behavior: Behavior normal.       Assessment:     Problem List Items Addressed This Visit          Psychiatric    Anxiety (Chronic)    Overview     Practice deep breathing or  abdominal breathing exercises when anxiety occurs.  Exercise daily. Get sunlight daily.  Avoid caffeine, alcohol and stimulants.  Practice positive phrases and repeat throughout the day, yoga, lavender scents or Chamomile tea will help anxiety.  Set healthy boundaries, avoid people and conversations that increase stress.  Reports any symptoms of suicidal or homicidal ideations immediately, if clinic is closed go to nearest emergency room.               Current Assessment & Plan     Chronic, slightly stable issue.  Patient would not like to make any changes to her current medication regimen as she does not want to add any additional medications.  Currently she is using all anxiety medicine as a p.r.n. medication.  States that this is working adequately for her and will continue to do this while she waits for her appointment to establish care with psychiatrist on May 30th.              Plan:   Diagnoses and all orders for this visit:    Anxiety       Follow up in about 6 months (around 10/13/2023), or if symptoms worsen or fail to improve, for Annual wellness visit..       This note was created with the assistance of a voice recognition software or phone dictation. There may be transcription errors as a result of using this technology however minimal. Effort has been made to assure accuracy of transcription but any obvious errors or omissions should be clarified with the author of the document      This service was not originating from a related E/M service provided within the previous 7 days nor will  to an E/M service or procedure within the next 24 hours or my soonest available appointment.  Prevailing standard of care was able to be met in this audio-only visit.       I spent a total of 20 minutes on the day of the visit.This includes face to face time and non-face to face time preparing to see the patient (eg, review of tests), obtaining and/or reviewing separately obtained history, documenting clinical  information in the electronic or other health record, independently interpreting results and communicating results to the patient/family/caregiver, or care coordinator.

## 2023-05-30 ENCOUNTER — OFFICE VISIT (OUTPATIENT)
Dept: BEHAVIORAL HEALTH | Facility: CLINIC | Age: 33
End: 2023-05-30
Payer: MEDICAID

## 2023-05-30 ENCOUNTER — PATIENT MESSAGE (OUTPATIENT)
Dept: BEHAVIORAL HEALTH | Facility: CLINIC | Age: 33
End: 2023-05-30
Payer: COMMERCIAL

## 2023-05-30 VITALS
DIASTOLIC BLOOD PRESSURE: 68 MMHG | OXYGEN SATURATION: 100 % | TEMPERATURE: 98 F | HEART RATE: 68 BPM | BODY MASS INDEX: 22.33 KG/M2 | SYSTOLIC BLOOD PRESSURE: 114 MMHG | WEIGHT: 126 LBS

## 2023-05-30 DIAGNOSIS — F41.1 GAD (GENERALIZED ANXIETY DISORDER): ICD-10-CM

## 2023-05-30 DIAGNOSIS — F90.2 ATTENTION DEFICIT HYPERACTIVITY DISORDER (ADHD), COMBINED TYPE: Primary | ICD-10-CM

## 2023-05-30 DIAGNOSIS — F34.1 PERSISTENT DEPRESSIVE DISORDER: ICD-10-CM

## 2023-05-30 LAB
AMPHET UR QL SCN: NEGATIVE
BARBITURATE SCN PRESENT UR: NEGATIVE
BENZODIAZ UR QL SCN: NEGATIVE
CANNABINOIDS UR QL SCN: NEGATIVE
COCAINE UR QL SCN: NEGATIVE
FENTANYL UR QL SCN: NEGATIVE
MDMA UR QL SCN: NEGATIVE
OPIATES UR QL SCN: NEGATIVE
PCP UR QL: NEGATIVE
PH UR: 6 [PH] (ref 3–11)

## 2023-05-30 PROCEDURE — 99204 PR OFFICE/OUTPT VISIT, NEW, LEVL IV, 45-59 MIN: ICD-10-PCS | Mod: AF,HB,S$PBB, | Performed by: STUDENT IN AN ORGANIZED HEALTH CARE EDUCATION/TRAINING PROGRAM

## 2023-05-30 PROCEDURE — 3008F PR BODY MASS INDEX (BMI) DOCUMENTED: ICD-10-PCS | Mod: CPTII,,, | Performed by: STUDENT IN AN ORGANIZED HEALTH CARE EDUCATION/TRAINING PROGRAM

## 2023-05-30 PROCEDURE — 3078F DIAST BP <80 MM HG: CPT | Mod: CPTII,,, | Performed by: STUDENT IN AN ORGANIZED HEALTH CARE EDUCATION/TRAINING PROGRAM

## 2023-05-30 PROCEDURE — 1160F RVW MEDS BY RX/DR IN RCRD: CPT | Mod: CPTII,,, | Performed by: STUDENT IN AN ORGANIZED HEALTH CARE EDUCATION/TRAINING PROGRAM

## 2023-05-30 PROCEDURE — 3078F PR MOST RECENT DIASTOLIC BLOOD PRESSURE < 80 MM HG: ICD-10-PCS | Mod: CPTII,,, | Performed by: STUDENT IN AN ORGANIZED HEALTH CARE EDUCATION/TRAINING PROGRAM

## 2023-05-30 PROCEDURE — 80307 DRUG TEST PRSMV CHEM ANLYZR: CPT | Performed by: STUDENT IN AN ORGANIZED HEALTH CARE EDUCATION/TRAINING PROGRAM

## 2023-05-30 PROCEDURE — 1159F PR MEDICATION LIST DOCUMENTED IN MEDICAL RECORD: ICD-10-PCS | Mod: CPTII,,, | Performed by: STUDENT IN AN ORGANIZED HEALTH CARE EDUCATION/TRAINING PROGRAM

## 2023-05-30 PROCEDURE — 99204 OFFICE O/P NEW MOD 45 MIN: CPT | Mod: AF,HB,S$PBB, | Performed by: STUDENT IN AN ORGANIZED HEALTH CARE EDUCATION/TRAINING PROGRAM

## 2023-05-30 PROCEDURE — 3074F PR MOST RECENT SYSTOLIC BLOOD PRESSURE < 130 MM HG: ICD-10-PCS | Mod: CPTII,,, | Performed by: STUDENT IN AN ORGANIZED HEALTH CARE EDUCATION/TRAINING PROGRAM

## 2023-05-30 PROCEDURE — 3074F SYST BP LT 130 MM HG: CPT | Mod: CPTII,,, | Performed by: STUDENT IN AN ORGANIZED HEALTH CARE EDUCATION/TRAINING PROGRAM

## 2023-05-30 PROCEDURE — 3008F BODY MASS INDEX DOCD: CPT | Mod: CPTII,,, | Performed by: STUDENT IN AN ORGANIZED HEALTH CARE EDUCATION/TRAINING PROGRAM

## 2023-05-30 PROCEDURE — 1160F PR REVIEW ALL MEDS BY PRESCRIBER/CLIN PHARMACIST DOCUMENTED: ICD-10-PCS | Mod: CPTII,,, | Performed by: STUDENT IN AN ORGANIZED HEALTH CARE EDUCATION/TRAINING PROGRAM

## 2023-05-30 PROCEDURE — 99214 OFFICE O/P EST MOD 30 MIN: CPT | Mod: PBBFAC,PN | Performed by: STUDENT IN AN ORGANIZED HEALTH CARE EDUCATION/TRAINING PROGRAM

## 2023-05-30 PROCEDURE — 1159F MED LIST DOCD IN RCRD: CPT | Mod: CPTII,,, | Performed by: STUDENT IN AN ORGANIZED HEALTH CARE EDUCATION/TRAINING PROGRAM

## 2023-05-30 RX ORDER — LISDEXAMFETAMINE DIMESYLATE 30 MG/1
30 CAPSULE ORAL EVERY MORNING
Qty: 30 CAPSULE | Refills: 0 | Status: SHIPPED | OUTPATIENT
Start: 2023-05-30 | End: 2023-06-09 | Stop reason: SDUPTHER
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
## 2023-06-05 ENCOUNTER — PATIENT MESSAGE (OUTPATIENT)
Dept: BEHAVIORAL HEALTH | Facility: CLINIC | Age: 33
End: 2023-06-05
Payer: COMMERCIAL

## 2023-06-09 ENCOUNTER — TELEPHONE (OUTPATIENT)
Dept: BEHAVIORAL HEALTH | Facility: CLINIC | Age: 33
End: 2023-06-09
Payer: COMMERCIAL

## 2023-06-09 DIAGNOSIS — F90.2 ATTENTION DEFICIT HYPERACTIVITY DISORDER (ADHD), COMBINED TYPE: Primary | ICD-10-CM

## 2023-06-09 RX ORDER — LISDEXAMFETAMINE DIMESYLATE 50 MG/1
50 CAPSULE ORAL EVERY MORNING
Qty: 30 CAPSULE | Refills: 0 | Status: SHIPPED | OUTPATIENT
Start: 2023-06-09 | End: 2023-06-13 | Stop reason: SDUPTHER

## 2023-06-09 NOTE — TELEPHONE ENCOUNTER
Pt is asking that her Vyvanse be increased. She states that after she takes it, she feels no different or change in anything.     Please advise

## 2023-06-09 NOTE — TELEPHONE ENCOUNTER
Pt called clinic to request dose adjustment to vyvanse.  No benefit from current dose (30mg daily).  PDMP reviewed and demonstrated no abnormal filling patterns.  Sent in new Rx for vyvanse 50mg daily.  Will follow up with pt as currently scheduled.

## 2023-06-13 ENCOUNTER — OFFICE VISIT (OUTPATIENT)
Dept: BEHAVIORAL HEALTH | Facility: CLINIC | Age: 33
End: 2023-06-13
Payer: MEDICAID

## 2023-06-13 DIAGNOSIS — F90.2 ATTENTION DEFICIT HYPERACTIVITY DISORDER (ADHD), COMBINED TYPE: Primary | ICD-10-CM

## 2023-06-13 DIAGNOSIS — F34.1 PERSISTENT DEPRESSIVE DISORDER: ICD-10-CM

## 2023-06-13 DIAGNOSIS — F41.1 GAD (GENERALIZED ANXIETY DISORDER): ICD-10-CM

## 2023-06-13 PROCEDURE — 1159F MED LIST DOCD IN RCRD: CPT | Mod: CPTII,95,, | Performed by: STUDENT IN AN ORGANIZED HEALTH CARE EDUCATION/TRAINING PROGRAM

## 2023-06-13 PROCEDURE — 99213 PR OFFICE/OUTPT VISIT, EST, LEVL III, 20-29 MIN: ICD-10-PCS | Mod: 95,AF,HB, | Performed by: STUDENT IN AN ORGANIZED HEALTH CARE EDUCATION/TRAINING PROGRAM

## 2023-06-13 PROCEDURE — 1160F RVW MEDS BY RX/DR IN RCRD: CPT | Mod: CPTII,95,, | Performed by: STUDENT IN AN ORGANIZED HEALTH CARE EDUCATION/TRAINING PROGRAM

## 2023-06-13 PROCEDURE — 1160F PR REVIEW ALL MEDS BY PRESCRIBER/CLIN PHARMACIST DOCUMENTED: ICD-10-PCS | Mod: CPTII,95,, | Performed by: STUDENT IN AN ORGANIZED HEALTH CARE EDUCATION/TRAINING PROGRAM

## 2023-06-13 PROCEDURE — 99213 OFFICE O/P EST LOW 20 MIN: CPT | Mod: 95,AF,HB, | Performed by: STUDENT IN AN ORGANIZED HEALTH CARE EDUCATION/TRAINING PROGRAM

## 2023-06-13 PROCEDURE — 1159F PR MEDICATION LIST DOCUMENTED IN MEDICAL RECORD: ICD-10-PCS | Mod: CPTII,95,, | Performed by: STUDENT IN AN ORGANIZED HEALTH CARE EDUCATION/TRAINING PROGRAM

## 2023-06-13 RX ORDER — LISDEXAMFETAMINE DIMESYLATE 50 MG/1
50 CAPSULE ORAL EVERY MORNING
Qty: 30 CAPSULE | Refills: 0 | Status: SHIPPED | OUTPATIENT
Start: 2023-06-13 | End: 2023-06-30 | Stop reason: SDUPTHER

## 2023-06-13 NOTE — PROGRESS NOTES
"Outpatient Psychiatry Follow-Up Visit    6/13/2023    Clinical Status of Patient:  Outpatient (Ambulatory)    Chief Complaint:  Nanci Clancy is a 32 y.o. female who presents today for follow-up of depression, anxiety, and attention problems. Patient last seen for initial evaluation on 5/30/2023. Met with patient.      TELE PSYCHIATRY Disclaimer   *The patient was informed despite using HIPPA compliant technology there may be risks including security breach, technological failure, inability to perform a comprehensive physical exam which could delay or prevent an accurate diagnosis, and potential complications from treatment decisions rendered over a telemedical platform.   The patient was also informed of the relationship between the physician and patient and the respective role of any other health care provider with respect to management of the patient; and notified that the pt may decline to receive medical services by telemedicine and may withdraw from such care at any time.     Patient's Current location: [address] [place] [address]    In Case of Emergency pts next of kin  Name:None provided  Phone number:  n/a  Visit type: Virtual visit with synchronous audio and video  Total time spent with patient: 25 minutes    Interval History and Content of Current Session:  Interim Events/Subjective Report/Content of Current Session:   Pt reports doing "ok"  overall.  Continues to take vyvanse 30mg daily, unable to find pharmacy with higher dose.  Reports improving mood, endorses continued mild depression, decreased anxiety  Sleeping improving.  Appetite stable, weight stable.  Energy improved, motivation unchanged, concentration improved.  Endorses irritability, denies hopelessness.  Denies SI/HI/AVH/paranoia, denies plan or desire for self harm or harm to others.  Denies SE from current regimen.  Denies somatic complaints.   Pt voices desire to adjust regimen to address ongoing symptoms.  " "    Psychiatric Review of Systems-is patient experiencing or having changes in  Anxiety: decreasing  Sleep: improving  Appetite: stable  Weight: stable  Energy: improved  Concentration: improved   Libido: no problem voiced  Irritability: yes  Motivation: unchanged  Guilt/hopelessness: denies  Paranoia/delusions: denies  SIB/risky behavior: denies    Review of Systems   PSYCHIATRIC: Pertinant items are noted in the narrative.  CONSTITUTIONAL: No weight gain or loss.  MUSCULOSKELETAL: +back pain, denies myalgias  NEUROLOGIC: No weakness, sensory changes, seizures, confusion, memory loss, tremor or other abnormal movements.  CARDIAC: No CP, no palpitations  RESPIRATORY: No shortness of breath.  CARDIOVASCULAR: No tachycardia or chest pain.  GASTROINTESTINAL: No nausea, vomiting, pain, constipation or diarrhea.    Past Medical, Family and Social History: The patient's past medical, family and social history have been reviewed and updated as appropriate within the electronic medical record - see encounter notes.    Compliance: good    Side effects: denies    Risk Parameters:  Patient reports no suicidal ideation  Patient reports no homicidal ideation  Patient reports no self-injurious behavior  Patient reports no violent behavior    Exam (detailed: at least 9 elements; comprehensive: all 15 elements)   Constitutional  Vitals:  Most recent vital signs, dated less than 90 days prior to this appointment, were reviewed.     No VS 2/2 virtual visit.        General:   Constitutional: No acute distress, appears stated age, casually dressed    Neurologic:   Motor: moves upper extremities spontaneously and without difficulty, no abnormal involuntary movements observed  Gait: normal gait and station    Mental status examination:   Appearance: appears stated age, casually dressed, no acute distress  Behavior: unremarkable for situation, calm and cooperative  Mood: "ok"  Affect: mood congruent and euthymic  Thought process: linear " and goal directed  Associations: appropriate for conversation  Thought content: no plan or desire for self harm or harm to others, denies paranoia, no delusional ideation volunteered  Perceptions: denies hallucinations or other altered perceptions  Orientation: oriented to day of week, month, year, location, and situation  Language: English, fluid  Attention: able to attend to interview  Insight: good  Judgement: good    PHQ9 5/30/2023   Total Score 24     GAD7 5/30/2023   1. Feeling nervous, anxious, or on edge? 3   2. Not being able to stop or control worrying? 3   3. Worrying too much about different things? 3   4. Trouble relaxing? 3   5. Being so restless that it is hard to sit still? 3   6. Becoming easily annoyed or irritable? 3   7. Feeling afraid as if something awful might happen? 3   8. If you checked off any problems, how difficult have these problems made it for you to do your work, take care of things at home, or get along with other people? 3   AGATA-7 Score 21     Assessment and Diagnosis   Status/Progress: Based on the examination today, the patient's problem(s) is/are improving.  New problems have not been presented today.    Medical availability  is complicating management of the primary condition.  Number of separate conditions addressed during today's visit: 1 (ADHD inadequately controlled).  Are medication adjustments being made today: No.  Are referral(s) being ordered today: No.  Complexity (level) of medical decision making employed in the encounter: LOW.    General Impression:    ICD-10-CM ICD-9-CM   1. Attention deficit hyperactivity disorder (ADHD), combined type  F90.2 314.01   2. Persistent depressive disorder  F34.1 300.4   3. AGATA (generalized anxiety disorder)  F41.1 300.02     Intervention/Counseling/Treatment Plan   Increase vyvanse 50mg daily, pt has been unable to obtain from pharmacy thus far  Will assess resonse of anxiety and depression symptoms to stimulant treatment and treat  as separate problem if necessary  Recent labwork in EMR reviewed, CBC and CMP wnl, TSH wnl  UDS negative at initial visit  No need for PEC as pt is not an imminent danger to self or others or gravely disabled due to acute psychiatric illness  Discussed that pt should either call clinic for psychiatric crisis symptoms or present to nearest emergency room    Discussed with patient informed consent including diagnosis, risks and benefits of proposed treatment above vs. alternative treatments vs. no treatment, as well as serious and common side effects of these treatments, and the inherent unpredictability of individual responses to these treatments. The patient expresses understanding of the above and displays the capacity to agree with this current plan. Patient also agrees that, currently, the benefits outweigh the risks and would like to pursue treatment at this time, and had no other questions.    Instructions:  Take all medications as prescribed.    Abstain from recreational drugs and alcohol.  Present to ED or call 911 for SI/HI plan or intent, psychosis, or medical emergency.    Return to Clinic: Follow up in about 4 weeks (around 7/11/2023).    Total time:   The total time for services performed on the date of the encounter (including review of prior visit notes, review of notes from other providers, review of results from laboratory/imaging studies, face-to-face time with patient, and time spent on other activities directly related to patient care): 25 minutes.    Miquel Be MD  [facility]

## 2023-06-17 ENCOUNTER — NURSE TRIAGE (OUTPATIENT)
Dept: ADMINISTRATIVE | Facility: CLINIC | Age: 33
End: 2023-06-17
Payer: COMMERCIAL

## 2023-06-18 NOTE — TELEPHONE ENCOUNTER
Reason for Disposition   Description of bite sounds severe to the triager    Additional Information   Negative: [1] Major bleeding (e.g., actively dripping or spurting) AND [2] can't be stopped   Negative: Sounds like a life-threatening emergency to the triager   Negative: Snake bite   Negative: Bite, wound, or sting from fish   Negative: [1] Any break in skin from BITE (e.g., cut, puncture or scratch) AND[2] WILD animal at risk for RABIES (e.g., bat, raccoon, ebrg, skunk, coyote, other carnivores; see Background for list.)   Negative: [1] Any break in skin from BITE (e.g., cut, puncture or scratch) AND[2] PET animal (e.g., dog, cat, or ferret) at risk for RABIES (e.g., sick, stray, unprovoked bite, developing country)   Negative: [1] Any break in skin from BITE (e.g., cut, puncture or scratch) AND[2] monkey   Negative: [1] EXPOSURE of non-intact skin (e.g., exposed person has dermatitis, abrasion, wound) AND[2] with animal BODY FLUID (e.g., saliva such as licking, blood, brain) AND[3] animal at high-risk for RABIES (e.g., bat, raccoon, berg, skunk, coyote, other carnivores)   Negative: [1] Cut (length > 1/8 inch or 3 mm) or skin tear AND [2] any animal  (Exception: Superficial scratch that doesn't go through dermis.)   Negative: [1] Bleeding AND [2] won't stop after 10 minutes of direct pressure (using correct technique)    Protocols used: Animal Bite-A-  pt called re dog bite. Pt states her dog bit her on hand. Pt states dachshund dog bit her directly on vein on top of hand. Bite barely due blood but made vein super swollen. veins up forearm are popping out. rating pain = 1. Dog is vaccinated. Pt concerned about vein/nerve damage. Rec ED. Pt agrees.

## 2023-06-30 ENCOUNTER — TELEPHONE (OUTPATIENT)
Dept: FAMILY MEDICINE | Facility: CLINIC | Age: 33
End: 2023-06-30
Payer: COMMERCIAL

## 2023-06-30 DIAGNOSIS — F90.2 ATTENTION DEFICIT HYPERACTIVITY DISORDER (ADHD), COMBINED TYPE: Primary | ICD-10-CM

## 2023-06-30 RX ORDER — LISDEXAMFETAMINE DIMESYLATE 60 MG/1
60 CAPSULE ORAL EVERY MORNING
Qty: 30 CAPSULE | Refills: 0 | Status: SHIPPED | OUTPATIENT
Start: 2023-06-30 | End: 2023-08-01 | Stop reason: SDUPTHER

## 2023-06-30 NOTE — TELEPHONE ENCOUNTER
Pt called clinic to report medication hasn't been working for symptoms.  Started new job and is having trouble with attention, accomplishing tasks, memory, energy.  Denies SE from vyvanse.  Wants to try higher dose.  Provided psychoeducation on vyvanse and stimulant medications.  Will send in Rx for vyvanse 60mg daily but unclear if pharmacy will fill since pt picked up 30 day supply on 6/14/2023.  All questions answered.

## 2023-06-30 NOTE — TELEPHONE ENCOUNTER
Pt called stating that since the increase of Vyvanse on 6/13, her anxiety has not gotten any better and now she's having panic attacks.    Please advise

## 2023-08-01 ENCOUNTER — OFFICE VISIT (OUTPATIENT)
Dept: BEHAVIORAL HEALTH | Facility: CLINIC | Age: 33
End: 2023-08-01
Payer: COMMERCIAL

## 2023-08-01 DIAGNOSIS — F34.1 PERSISTENT DEPRESSIVE DISORDER: ICD-10-CM

## 2023-08-01 DIAGNOSIS — F41.1 GAD (GENERALIZED ANXIETY DISORDER): ICD-10-CM

## 2023-08-01 DIAGNOSIS — F90.2 ATTENTION DEFICIT HYPERACTIVITY DISORDER (ADHD), COMBINED TYPE: Primary | ICD-10-CM

## 2023-08-01 PROCEDURE — 1159F PR MEDICATION LIST DOCUMENTED IN MEDICAL RECORD: ICD-10-PCS | Mod: CPTII,95,, | Performed by: STUDENT IN AN ORGANIZED HEALTH CARE EDUCATION/TRAINING PROGRAM

## 2023-08-01 PROCEDURE — 99214 OFFICE O/P EST MOD 30 MIN: CPT | Mod: AF,HB,95, | Performed by: STUDENT IN AN ORGANIZED HEALTH CARE EDUCATION/TRAINING PROGRAM

## 2023-08-01 PROCEDURE — 1160F PR REVIEW ALL MEDS BY PRESCRIBER/CLIN PHARMACIST DOCUMENTED: ICD-10-PCS | Mod: CPTII,95,, | Performed by: STUDENT IN AN ORGANIZED HEALTH CARE EDUCATION/TRAINING PROGRAM

## 2023-08-01 PROCEDURE — 1159F MED LIST DOCD IN RCRD: CPT | Mod: CPTII,95,, | Performed by: STUDENT IN AN ORGANIZED HEALTH CARE EDUCATION/TRAINING PROGRAM

## 2023-08-01 PROCEDURE — 99214 PR OFFICE/OUTPT VISIT, EST, LEVL IV, 30-39 MIN: ICD-10-PCS | Mod: AF,HB,95, | Performed by: STUDENT IN AN ORGANIZED HEALTH CARE EDUCATION/TRAINING PROGRAM

## 2023-08-01 PROCEDURE — 1160F RVW MEDS BY RX/DR IN RCRD: CPT | Mod: CPTII,95,, | Performed by: STUDENT IN AN ORGANIZED HEALTH CARE EDUCATION/TRAINING PROGRAM

## 2023-08-01 RX ORDER — LISDEXAMFETAMINE DIMESYLATE 60 MG/1
60 CAPSULE ORAL EVERY MORNING
Qty: 30 CAPSULE | Refills: 0 | Status: SHIPPED | OUTPATIENT
Start: 2023-09-01 | End: 2023-10-04 | Stop reason: SDUPTHER

## 2023-08-01 RX ORDER — LISDEXAMFETAMINE DIMESYLATE 60 MG/1
60 CAPSULE ORAL EVERY MORNING
Qty: 30 CAPSULE | Refills: 0 | Status: SHIPPED | OUTPATIENT
Start: 2023-08-02 | End: 2023-09-13 | Stop reason: ALTCHOICE

## 2023-08-01 RX ORDER — LISDEXAMFETAMINE DIMESYLATE 60 MG/1
60 CAPSULE ORAL EVERY MORNING
Qty: 30 CAPSULE | Refills: 0 | Status: SHIPPED | OUTPATIENT
Start: 2023-10-01 | End: 2023-10-04 | Stop reason: SDUPTHER

## 2023-08-01 RX ORDER — ESCITALOPRAM OXALATE 10 MG/1
TABLET ORAL
Qty: 30 TABLET | Refills: 5 | Status: SHIPPED | OUTPATIENT
Start: 2023-08-01 | End: 2023-08-23 | Stop reason: SDUPTHER

## 2023-08-01 RX ORDER — ESCITALOPRAM OXALATE 5 MG/1
TABLET ORAL
Qty: 7 TABLET | Refills: 0 | Status: SHIPPED | OUTPATIENT
Start: 2023-08-01 | End: 2023-08-23 | Stop reason: DRUGHIGH

## 2023-08-01 NOTE — PROGRESS NOTES
"Outpatient Psychiatry Follow-Up Visit    8/1/2023    Clinical Status of Patient:  Outpatient (Ambulatory)    Chief Complaint:  Nanci Clancy is a 32 y.o. female who presents today for follow-up of depression, anxiety, and attention problems. Patient last seen for initial evaluation on 5/30/2023. Met with patient.      TELE PSYCHIATRY Disclaimer   *The patient was informed despite using HIPPA compliant technology there may be risks including security breach, technological failure, inability to perform a comprehensive physical exam which could delay or prevent an accurate diagnosis, and potential complications from treatment decisions rendered over a telemedical platform.   The patient was also informed of the relationship between the physician and patient and the respective role of any other health care provider with respect to management of the patient; and notified that the pt may decline to receive medical services by telemedicine and may withdraw from such care at any time.     Patient's Current location: [address] [place] [address]    In Case of Emergency pts next of kin  Name:None provided  Phone number:  n/a  Visit type: Virtual visit with synchronous audio and video  Total time spent with patient: 25 minutes    Interval History and Content of Current Session:  Interim Events/Subjective Report/Content of Current Session:   Pt reports doing "ok"  overall.  Now working new job, having more anxiety.  Now working for insurance company.  Reports good mood, elevated anxiety.  Sleeping "a lot," tired on awakening, 9-10 hrs nightly, 2-3x nightly.  Appetite good, weight good.  Energy low, motivation good.  Endorses irritability, denies hopelessness.  Denies SI/HI/AVH/paranoia, denies plan or desire for self harm or harm to others.  Reports SE from current regimen: occasional headaches.. Reports somatic complaints of headaches. Pt voices desire to adjust regimen to address ongoing symptoms.  " "    Psychiatric Review of Systems-is patient experiencing or having changes in  Anxiety: increased 2/2 work stress  Sleep: poor, 9-10 hrs nightly  Appetite: stable  Weight: stable  Energy: low  Concentration: fair  Libido: no problem voiced  Irritability: yes  Motivation: unchanged  Guilt/hopelessness: denies  Paranoia/delusions: denies  SIB/risky behavior: denies    Review of Systems   PSYCHIATRIC: Pertinant items are noted in the narrative.  CONSTITUTIONAL: No weight gain or loss.  MUSCULOSKELETAL: +back pain, denies myalgias  NEUROLOGIC: No weakness, sensory changes, seizures, confusion, memory loss, tremor or other abnormal movements.  +headcaches  CARDIAC: No CP, no palpitations  RESPIRATORY: No shortness of breath.  CARDIOVASCULAR: No tachycardia or chest pain.  GASTROINTESTINAL: No nausea, vomiting, pain, constipation or diarrhea.    Past Medical, Family and Social History: The patient's past medical, family and social history have been reviewed and updated as appropriate within the electronic medical record - see encounter notes.    Compliance: good    Side effects: denies    Risk Parameters:  Patient reports no suicidal ideation  Patient reports no homicidal ideation  Patient reports no self-injurious behavior  Patient reports no violent behavior    Exam (detailed: at least 9 elements; comprehensive: all 15 elements)   Constitutional  Vitals:  Most recent vital signs, dated less than 90 days prior to this appointment, were reviewed.     No VS 2/2 virtual visit.        General:   Constitutional: No acute distress, appears stated age, casually dressed    Neurologic:   Motor: moves upper extremities spontaneously and without difficulty, no abnormal involuntary movements observed  Gait: normal gait and station    Mental status examination:   Appearance: appears stated age, casually dressed, no acute distress  Behavior: unremarkable for situation, calm and cooperative  Mood: "stressed"  Affect: mood congruent, " anxious appearing  Thought process: linear and goal directed  Associations: appropriate for conversation  Thought content: no plan or desire for self harm or harm to others, denies paranoia, no delusional ideation volunteered  Perceptions: denies hallucinations or other altered perceptions  Orientation: oriented to day of week, month, year, location, and situation  Language: English, fluid  Attention: able to attend to interview  Insight: good  Judgement: good    PHQ9 5/30/2023   Total Score 24     GAD7 5/30/2023   1. Feeling nervous, anxious, or on edge? 3   2. Not being able to stop or control worrying? 3   3. Worrying too much about different things? 3   4. Trouble relaxing? 3   5. Being so restless that it is hard to sit still? 3   6. Becoming easily annoyed or irritable? 3   7. Feeling afraid as if something awful might happen? 3   8. If you checked off any problems, how difficult have these problems made it for you to do your work, take care of things at home, or get along with other people? 3   AGATA-7 Score 21     Assessment and Diagnosis   Status/Progress: Based on the examination today, the patient's problem(s) is/are adequately but not ideally controlled.  New problems have been presented today (anxiety).   Co-morbidities and Lack of compliance are not complicating management of the primary condition.  Number of separate conditions addressed during today's visit: 2 (ADHD stable, anxiety uncontrolled).  Are medication adjustments being made today: Yes.  Are referral(s) being ordered today: No.  Complexity (level) of medical decision making employed in the encounter: MODERATE.    General Impression:    ICD-10-CM ICD-9-CM   1. Attention deficit hyperactivity disorder (ADHD), combined type  F90.2 314.01   2. Persistent depressive disorder  F34.1 300.4   3. AGATA (generalized anxiety disorder)  F41.1 300.02     Intervention/Counseling/Treatment Plan   Continue vyvanse 60mg daily  Start lexapro 5mg daily x7 days,  then increase to 10mg daily thereafter, discussed potential SE including but not limited to GI upset, headache, suicidal thinking  Recent labwork in EMR reviewed, CBC and CMP wnl, TSH wnl  UDS negative at initial visit  No need for PEC as pt is not an imminent danger to self or others or gravely disabled due to acute psychiatric illness  Discussed that pt should either call clinic for psychiatric crisis symptoms or present to nearest emergency room    Discussed with patient informed consent including diagnosis, risks and benefits of proposed treatment above vs. alternative treatments vs. no treatment, as well as serious and common side effects of these treatments, and the inherent unpredictability of individual responses to these treatments. The patient expresses understanding of the above and displays the capacity to agree with this current plan. Patient also agrees that, currently, the benefits outweigh the risks and would like to pursue treatment at this time, and had no other questions.    Instructions:  Take all medications as prescribed.    Abstain from recreational drugs and alcohol.  Present to ED or call 911 for SI/HI plan or intent, psychosis, or medical emergency.    Return to Clinic: Follow up in about 6 weeks (around 9/12/2023).    Total time:   The total time for services performed on the date of the encounter (including review of prior visit notes, review of notes from other providers, review of results from laboratory/imaging studies, face-to-face time with patient, and time spent on other activities directly related to patient care): 30 minutes.    Miquel Be MD  [facility]

## 2023-08-23 ENCOUNTER — TELEPHONE (OUTPATIENT)
Dept: FAMILY MEDICINE | Facility: CLINIC | Age: 33
End: 2023-08-23
Payer: COMMERCIAL

## 2023-08-23 DIAGNOSIS — F41.1 GAD (GENERALIZED ANXIETY DISORDER): ICD-10-CM

## 2023-08-23 DIAGNOSIS — F34.1 PERSISTENT DEPRESSIVE DISORDER: ICD-10-CM

## 2023-08-23 RX ORDER — ESCITALOPRAM OXALATE 10 MG/1
10 TABLET ORAL DAILY
Qty: 90 TABLET | Refills: 3 | Status: SHIPPED | OUTPATIENT
Start: 2023-08-23 | End: 2023-09-13 | Stop reason: ALTCHOICE

## 2023-09-13 ENCOUNTER — OFFICE VISIT (OUTPATIENT)
Dept: BEHAVIORAL HEALTH | Facility: CLINIC | Age: 33
End: 2023-09-13
Payer: COMMERCIAL

## 2023-09-13 VITALS
DIASTOLIC BLOOD PRESSURE: 68 MMHG | TEMPERATURE: 98 F | SYSTOLIC BLOOD PRESSURE: 116 MMHG | HEART RATE: 67 BPM | BODY MASS INDEX: 22.04 KG/M2 | OXYGEN SATURATION: 98 % | WEIGHT: 124.38 LBS

## 2023-09-13 DIAGNOSIS — F90.2 ATTENTION DEFICIT HYPERACTIVITY DISORDER (ADHD), COMBINED TYPE: Primary | ICD-10-CM

## 2023-09-13 DIAGNOSIS — F41.1 GAD (GENERALIZED ANXIETY DISORDER): ICD-10-CM

## 2023-09-13 DIAGNOSIS — F34.1 PERSISTENT DEPRESSIVE DISORDER: ICD-10-CM

## 2023-09-13 PROCEDURE — 3008F BODY MASS INDEX DOCD: CPT | Mod: CPTII,,, | Performed by: STUDENT IN AN ORGANIZED HEALTH CARE EDUCATION/TRAINING PROGRAM

## 2023-09-13 PROCEDURE — 99213 OFFICE O/P EST LOW 20 MIN: CPT | Mod: PBBFAC,PN | Performed by: STUDENT IN AN ORGANIZED HEALTH CARE EDUCATION/TRAINING PROGRAM

## 2023-09-13 PROCEDURE — 3078F DIAST BP <80 MM HG: CPT | Mod: CPTII,,, | Performed by: STUDENT IN AN ORGANIZED HEALTH CARE EDUCATION/TRAINING PROGRAM

## 2023-09-13 PROCEDURE — 99214 PR OFFICE/OUTPT VISIT, EST, LEVL IV, 30-39 MIN: ICD-10-PCS | Mod: AF,HB,S$PBB, | Performed by: STUDENT IN AN ORGANIZED HEALTH CARE EDUCATION/TRAINING PROGRAM

## 2023-09-13 PROCEDURE — 3074F PR MOST RECENT SYSTOLIC BLOOD PRESSURE < 130 MM HG: ICD-10-PCS | Mod: CPTII,,, | Performed by: STUDENT IN AN ORGANIZED HEALTH CARE EDUCATION/TRAINING PROGRAM

## 2023-09-13 PROCEDURE — 1159F PR MEDICATION LIST DOCUMENTED IN MEDICAL RECORD: ICD-10-PCS | Mod: CPTII,,, | Performed by: STUDENT IN AN ORGANIZED HEALTH CARE EDUCATION/TRAINING PROGRAM

## 2023-09-13 PROCEDURE — 3078F PR MOST RECENT DIASTOLIC BLOOD PRESSURE < 80 MM HG: ICD-10-PCS | Mod: CPTII,,, | Performed by: STUDENT IN AN ORGANIZED HEALTH CARE EDUCATION/TRAINING PROGRAM

## 2023-09-13 PROCEDURE — 1160F PR REVIEW ALL MEDS BY PRESCRIBER/CLIN PHARMACIST DOCUMENTED: ICD-10-PCS | Mod: CPTII,,, | Performed by: STUDENT IN AN ORGANIZED HEALTH CARE EDUCATION/TRAINING PROGRAM

## 2023-09-13 PROCEDURE — 99214 OFFICE O/P EST MOD 30 MIN: CPT | Mod: AF,HB,S$PBB, | Performed by: STUDENT IN AN ORGANIZED HEALTH CARE EDUCATION/TRAINING PROGRAM

## 2023-09-13 PROCEDURE — 3008F PR BODY MASS INDEX (BMI) DOCUMENTED: ICD-10-PCS | Mod: CPTII,,, | Performed by: STUDENT IN AN ORGANIZED HEALTH CARE EDUCATION/TRAINING PROGRAM

## 2023-09-13 PROCEDURE — 3074F SYST BP LT 130 MM HG: CPT | Mod: CPTII,,, | Performed by: STUDENT IN AN ORGANIZED HEALTH CARE EDUCATION/TRAINING PROGRAM

## 2023-09-13 PROCEDURE — 1160F RVW MEDS BY RX/DR IN RCRD: CPT | Mod: CPTII,,, | Performed by: STUDENT IN AN ORGANIZED HEALTH CARE EDUCATION/TRAINING PROGRAM

## 2023-09-13 PROCEDURE — 1159F MED LIST DOCD IN RCRD: CPT | Mod: CPTII,,, | Performed by: STUDENT IN AN ORGANIZED HEALTH CARE EDUCATION/TRAINING PROGRAM

## 2023-09-13 RX ORDER — CITALOPRAM 20 MG/1
20 TABLET, FILM COATED ORAL DAILY
Qty: 30 TABLET | Refills: 11 | Status: SHIPPED | OUTPATIENT
Start: 2023-09-13 | End: 2023-12-07 | Stop reason: ALTCHOICE

## 2023-09-13 NOTE — PROGRESS NOTES
"Outpatient Psychiatry Follow-Up Visit    9/13/2023    Clinical Status of Patient:  Outpatient (Ambulatory)    Chief Complaint:  Nanci Clancy is a 32 y.o. female who presents today for follow-up of depression, anxiety, and attention problems. Patient last seen for follow-up on 8/1/2023. Met with patient.      Interval History and Content of Current Session:  Interim Events/Subjective Report/Content of Current Session:   Pt reports doing "alright"  overall.  Notes SE from lexapro of sedation, bloating and poor energy/motivation.  Reports improved mood, improved anxiety.  Sleep increased.  Appetite increased, weight stable.  Energy lower, motivation lower, concentration worse.  Improved irritability, improved hopelessness.  Denies SI/HI/AVH/paranoia, denies plan or desire for self harm or harm to others.  Reports somatic complaints of bloated feeling of abdomen, shoulder tension. Pt voices desire to adjust regimen to address ongoing symptoms.      Psychiatric Review of Systems-is patient experiencing or having changes in  Anxiety: improved  Sleep: increased  Appetite: stable  Weight: stable  Energy: lower  Concentration: worse  Libido: no problem voiced  Irritability: yes  Motivation: unchanged  Guilt/hopelessness: denies  Paranoia/delusions: denies  SIB/risky behavior: denies    Review of Systems   PSYCHIATRIC: Pertinant items are noted in the narrative.  CONSTITUTIONAL: No weight gain or loss.  MUSCULOSKELETAL: +back pain, denies myalgias  NEUROLOGIC: No weakness, sensory changes, seizures, confusion, memory loss, tremor or other abnormal movements.   CARDIAC: No CP, no palpitations  RESPIRATORY: No shortness of breath.  CARDIOVASCULAR: No tachycardia or chest pain.  GASTROINTESTINAL: No nausea, vomiting, pain, constipation or diarrhea.    Past Medical, Family and Social History: The patient's past medical, family and social history have been reviewed and updated as appropriate within the electronic " "medical record - see encounter notes.    Compliance: good    Side effects: denies    Risk Parameters:  Patient reports no suicidal ideation  Patient reports no homicidal ideation  Patient reports no self-injurious behavior  Patient reports no violent behavior    Exam (detailed: at least 9 elements; comprehensive: all 15 elements)   Constitutional  Vitals:  Most recent vital signs, dated less than 90 days prior to this appointment, were reviewed.     Vitals:    09/13/23 1541   BP: 116/68   Pulse: 67   Temp: 98.3 °F (36.8 °C)   SpO2: 98%   Weight: 56.4 kg (124 lb 6.4 oz)        General:   Constitutional: No acute distress, appears stated age, casually dressed    Neurologic:   Motor: moves upper extremities spontaneously and without difficulty, no abnormal involuntary movements observed  Gait: normal gait and station    Mental status examination:   Appearance: appears stated age, casually dressed, no acute distress  Behavior: unremarkable for situation, calm and cooperative  Mood: "ok"  Affect: mood congruent, anxious appearing  Thought process: linear and goal directed  Associations: appropriate for conversation  Thought content: no plan or desire for self harm or harm to others, denies paranoia, no delusional ideation volunteered  Perceptions: denies hallucinations or other altered perceptions  Orientation: oriented to day of week, month, year, location, and situation  Language: English, fluid  Attention: able to attend to interview  Insight: good  Judgement: good    PHQ9:  Over the last two weeks how often have you been bothered by little interest or pleasure in doing things: 0  Over the last two weeks how often have you been bothered by feeling down, depressed or hopeless: 0  PHQ-2 Total Score: 0  PHQ-4 Score: 6  PHQ-9 Score: 11  PHQ-9 Interpretation: Moderate        9/13/2023     3:41 PM 5/30/2023     9:08 AM   GAD7   1. Feeling nervous, anxious, or on edge? 1 3   2. Not being able to stop or control worrying? 3 3 "   3. Worrying too much about different things? 3 3   4. Trouble relaxing? 3 3   5. Being so restless that it is hard to sit still? 1 3   6. Becoming easily annoyed or irritable? 1 3   7. Feeling afraid as if something awful might happen? 0 3   8. If you checked off any problems, how difficult have these problems made it for you to do your work, take care of things at home, or get along with other people? 1 3   AGATA-7 Score 12 21     Assessment and Diagnosis   Status/Progress: Based on the examination today, the patient's problem(s) is/are adequately but not ideally controlled.  New problems have not been presented today.   Co-morbidities and Lack of compliance are not complicating management of the primary condition.  Number of separate conditions addressed during today's visit: 3 (ADHD fair control, mood improved, anxiety improved).  Are medication adjustments being made today: Yes.  Are referral(s) being ordered today: No.  Complexity (level) of medical decision making employed in the encounter: MODERATE.    General Impression:    ICD-10-CM ICD-9-CM   1. Attention deficit hyperactivity disorder (ADHD), combined type  F90.2 314.01   2. Persistent depressive disorder  F34.1 300.4   3. AGATA (generalized anxiety disorder)  F41.1 300.02     Intervention/Counseling/Treatment Plan   Continue vyvanse 60mg daily  Discontinue lexapro due to SE  Start celexa 20mg daily, will plan for 1:1 switch from lexapro, consider prozac vs cymbalta if celexa isn't a good fit  Recent labwork in EMR reviewed, CBC and CMP wnl, TSH wnl  UDS negative at initial visit  No need for PEC as pt is not an imminent danger to self or others or gravely disabled due to acute psychiatric illness  Discussed that pt should either call clinic for psychiatric crisis symptoms or present to nearest emergency room    Discussed with patient informed consent including diagnosis, risks and benefits of proposed treatment above vs. alternative treatments vs. no  treatment, as well as serious and common side effects of these treatments, and the inherent unpredictability of individual responses to these treatments. The patient expresses understanding of the above and displays the capacity to agree with this current plan. Patient also agrees that, currently, the benefits outweigh the risks and would like to pursue treatment at this time, and had no other questions.    Instructions:  Take all medications as prescribed.    Abstain from recreational drugs and alcohol.  Present to ED or call 911 for SI/HI plan or intent, psychosis, or medical emergency.    Return to Clinic: Follow up in about 8 weeks (around 11/8/2023).    Total time:   The total time for services performed on the date of the encounter (including review of prior visit notes, review of notes from other providers, review of results from laboratory/imaging studies, face-to-face time with patient, and time spent on other activities directly related to patient care): 25 minutes.    Miquel Be MD  [facility]

## 2023-10-03 ENCOUNTER — TELEPHONE (OUTPATIENT)
Dept: BEHAVIORAL HEALTH | Facility: CLINIC | Age: 33
End: 2023-10-03
Payer: COMMERCIAL

## 2023-10-03 DIAGNOSIS — F90.2 ATTENTION DEFICIT HYPERACTIVITY DISORDER (ADHD), COMBINED TYPE: ICD-10-CM

## 2023-10-04 ENCOUNTER — TELEPHONE (OUTPATIENT)
Dept: FAMILY MEDICINE | Facility: CLINIC | Age: 33
End: 2023-10-04
Payer: COMMERCIAL

## 2023-10-04 RX ORDER — LISDEXAMFETAMINE DIMESYLATE 60 MG/1
60 CAPSULE ORAL EVERY MORNING
Qty: 30 CAPSULE | Refills: 0 | Status: SHIPPED | OUTPATIENT
Start: 2023-11-03 | End: 2023-11-08 | Stop reason: ALTCHOICE

## 2023-10-04 RX ORDER — LISDEXAMFETAMINE DIMESYLATE 60 MG/1
60 CAPSULE ORAL EVERY MORNING
Qty: 30 CAPSULE | Refills: 0 | Status: SHIPPED | OUTPATIENT
Start: 2023-11-03 | End: 2023-10-04 | Stop reason: SDUPTHER

## 2023-10-04 RX ORDER — LISDEXAMFETAMINE DIMESYLATE 60 MG/1
60 CAPSULE ORAL EVERY MORNING
Qty: 30 CAPSULE | Refills: 0 | Status: SHIPPED | OUTPATIENT
Start: 2023-10-04 | End: 2023-11-08 | Stop reason: ALTCHOICE

## 2023-10-04 RX ORDER — LISDEXAMFETAMINE DIMESYLATE 60 MG/1
60 CAPSULE ORAL EVERY MORNING
Qty: 30 CAPSULE | Refills: 0 | Status: SHIPPED | OUTPATIENT
Start: 2023-10-04 | End: 2023-10-04 | Stop reason: SDUPTHER

## 2023-10-06 ENCOUNTER — PATIENT MESSAGE (OUTPATIENT)
Dept: BEHAVIORAL HEALTH | Facility: CLINIC | Age: 33
End: 2023-10-06
Payer: COMMERCIAL

## 2023-10-30 ENCOUNTER — PATIENT MESSAGE (OUTPATIENT)
Dept: BEHAVIORAL HEALTH | Facility: CLINIC | Age: 33
End: 2023-10-30
Payer: COMMERCIAL

## 2023-11-08 ENCOUNTER — OFFICE VISIT (OUTPATIENT)
Dept: BEHAVIORAL HEALTH | Facility: CLINIC | Age: 33
End: 2023-11-08
Payer: COMMERCIAL

## 2023-11-08 VITALS
BODY MASS INDEX: 22.11 KG/M2 | OXYGEN SATURATION: 99 % | DIASTOLIC BLOOD PRESSURE: 68 MMHG | TEMPERATURE: 98 F | HEART RATE: 67 BPM | SYSTOLIC BLOOD PRESSURE: 102 MMHG | WEIGHT: 124.81 LBS

## 2023-11-08 DIAGNOSIS — F41.1 GAD (GENERALIZED ANXIETY DISORDER): ICD-10-CM

## 2023-11-08 DIAGNOSIS — F34.1 PERSISTENT DEPRESSIVE DISORDER: ICD-10-CM

## 2023-11-08 DIAGNOSIS — F90.2 ATTENTION DEFICIT HYPERACTIVITY DISORDER (ADHD), COMBINED TYPE: Primary | ICD-10-CM

## 2023-11-08 PROCEDURE — 3078F PR MOST RECENT DIASTOLIC BLOOD PRESSURE < 80 MM HG: ICD-10-PCS | Mod: CPTII,,, | Performed by: STUDENT IN AN ORGANIZED HEALTH CARE EDUCATION/TRAINING PROGRAM

## 2023-11-08 PROCEDURE — 3074F SYST BP LT 130 MM HG: CPT | Mod: CPTII,,, | Performed by: STUDENT IN AN ORGANIZED HEALTH CARE EDUCATION/TRAINING PROGRAM

## 2023-11-08 PROCEDURE — 1159F MED LIST DOCD IN RCRD: CPT | Mod: CPTII,,, | Performed by: STUDENT IN AN ORGANIZED HEALTH CARE EDUCATION/TRAINING PROGRAM

## 2023-11-08 PROCEDURE — 3008F BODY MASS INDEX DOCD: CPT | Mod: CPTII,,, | Performed by: STUDENT IN AN ORGANIZED HEALTH CARE EDUCATION/TRAINING PROGRAM

## 2023-11-08 PROCEDURE — 3074F PR MOST RECENT SYSTOLIC BLOOD PRESSURE < 130 MM HG: ICD-10-PCS | Mod: CPTII,,, | Performed by: STUDENT IN AN ORGANIZED HEALTH CARE EDUCATION/TRAINING PROGRAM

## 2023-11-08 PROCEDURE — 1160F PR REVIEW ALL MEDS BY PRESCRIBER/CLIN PHARMACIST DOCUMENTED: ICD-10-PCS | Mod: CPTII,,, | Performed by: STUDENT IN AN ORGANIZED HEALTH CARE EDUCATION/TRAINING PROGRAM

## 2023-11-08 PROCEDURE — 99213 OFFICE O/P EST LOW 20 MIN: CPT | Mod: PBBFAC,PN | Performed by: STUDENT IN AN ORGANIZED HEALTH CARE EDUCATION/TRAINING PROGRAM

## 2023-11-08 PROCEDURE — 3078F DIAST BP <80 MM HG: CPT | Mod: CPTII,,, | Performed by: STUDENT IN AN ORGANIZED HEALTH CARE EDUCATION/TRAINING PROGRAM

## 2023-11-08 PROCEDURE — 1160F RVW MEDS BY RX/DR IN RCRD: CPT | Mod: CPTII,,, | Performed by: STUDENT IN AN ORGANIZED HEALTH CARE EDUCATION/TRAINING PROGRAM

## 2023-11-08 PROCEDURE — 3008F PR BODY MASS INDEX (BMI) DOCUMENTED: ICD-10-PCS | Mod: CPTII,,, | Performed by: STUDENT IN AN ORGANIZED HEALTH CARE EDUCATION/TRAINING PROGRAM

## 2023-11-08 PROCEDURE — 1159F PR MEDICATION LIST DOCUMENTED IN MEDICAL RECORD: ICD-10-PCS | Mod: CPTII,,, | Performed by: STUDENT IN AN ORGANIZED HEALTH CARE EDUCATION/TRAINING PROGRAM

## 2023-11-08 PROCEDURE — 99214 PR OFFICE/OUTPT VISIT, EST, LEVL IV, 30-39 MIN: ICD-10-PCS | Mod: AF,HB,S$PBB, | Performed by: STUDENT IN AN ORGANIZED HEALTH CARE EDUCATION/TRAINING PROGRAM

## 2023-11-08 PROCEDURE — 99214 OFFICE O/P EST MOD 30 MIN: CPT | Mod: AF,HB,S$PBB, | Performed by: STUDENT IN AN ORGANIZED HEALTH CARE EDUCATION/TRAINING PROGRAM

## 2023-11-08 RX ORDER — DEXTROAMPHETAMINE SULFATE 10 MG/1
10 CAPSULE, EXTENDED RELEASE ORAL DAILY
Qty: 14 CAPSULE | Refills: 0 | Status: SHIPPED | OUTPATIENT
Start: 2023-11-08 | End: 2023-11-13 | Stop reason: SDUPTHER

## 2023-11-08 NOTE — PROGRESS NOTES
"Outpatient Psychiatry Follow-Up Visit    11/8/2023    Clinical Status of Patient:  Outpatient (Ambulatory)    Chief Complaint:  Nanci Clancy is a 33 y.o. female who presents today for follow-up of depression, anxiety, and attention problems. Patient last seen for follow-up on 9/13/2023. Met with patient.      Interval History and Content of Current Session:  Interim Events/Subjective Report/Content of Current Session:   Pt reports doing "not great"  overall.  Reports that her Vyvanse was changed to generic product, notes that the medicine does not feel like it is doing anything at all.   Reports flat mood, low anxiety.  Reports no interest in doing pleasurable things.  Sleep increased. Appetite increased, weight decreased.  Energy poor, motivation poor, concentration poor.  Denies irritability, denies hopelessness.  Denies SI/HI/AVH/paranoia, denies plan or desire for self harm or harm to others.  Denies SE from current regimen. Denies somatic complaints.   Pt voices desire to adjust regimen to address ongoing symptoms.      Psychiatric Review of Systems-is patient experiencing or having changes in  See HPI above.     Review of Systems   PSYCHIATRIC: Pertinant items are noted in the narrative.  CONSTITUTIONAL: No weight gain or loss.  MUSCULOSKELETAL: +back pain, denies myalgias  NEUROLOGIC: No weakness, sensory changes, seizures, confusion, memory loss, tremor or other abnormal movements.   CARDIAC: No CP, no palpitations  RESPIRATORY: No shortness of breath.  CARDIOVASCULAR: No tachycardia or chest pain.  GASTROINTESTINAL: No nausea, vomiting, pain, constipation or diarrhea.    Past Medical, Family and Social History: The patient's past medical, family and social history have been reviewed and updated as appropriate within the electronic medical record - see encounter notes.    Compliance: good    Side effects: denies    Risk Parameters:  Patient reports no suicidal ideation  Patient reports no " "homicidal ideation  Patient reports no self-injurious behavior  Patient reports no violent behavior    Exam (detailed: at least 9 elements; comprehensive: all 15 elements)   Constitutional  Vitals:  Most recent vital signs, dated less than 90 days prior to this appointment, were reviewed.     Vitals:    11/08/23 1520   BP: 102/68   Pulse: 67   Temp: 98.3 °F (36.8 °C)   SpO2: 99%   Weight: 56.6 kg (124 lb 12.8 oz)        General:   Constitutional: No acute distress, appears stated age, casually dressed    Neurologic:   Motor: moves upper extremities spontaneously and without difficulty, no abnormal involuntary movements observed  Gait: normal gait and station    Mental status examination:   Appearance: appears stated age, casually dressed, no acute distress  Behavior: unremarkable for situation, calm and cooperative  Mood: "blah"  Affect: mood congruent, constricted  Thought process: linear and goal directed  Associations: appropriate for conversation  Thought content: no plan or desire for self harm or harm to others, denies paranoia, no delusional ideation volunteered  Perceptions: denies hallucinations or other altered perceptions  Orientation: oriented to day of week, month, year, location, and situation  Language: English, fluid  Attention: able to attend to interview  Insight: good  Judgement: good    PHQ9:  Over the last two weeks how often have you been bothered by little interest or pleasure in doing things: 1  Over the last two weeks how often have you been bothered by feeling down, depressed or hopeless: 1  PHQ-2 Total Score: 2  PHQ-9 Score: 13  PHQ-9 Interpretation: Moderate        11/8/2023     3:19 PM 9/13/2023     3:41 PM 5/30/2023     9:08 AM   GAD7   1. Feeling nervous, anxious, or on edge? 3 1 3   2. Not being able to stop or control worrying? 3 3 3   3. Worrying too much about different things? 3 3 3   4. Trouble relaxing? 3 3 3   5. Being so restless that it is hard to sit still? 0 1 3   6. " Becoming easily annoyed or irritable? 2 1 3   7. Feeling afraid as if something awful might happen? 0 0 3   8. If you checked off any problems, how difficult have these problems made it for you to do your work, take care of things at home, or get along with other people? 2 1 3   AGATA-7 Score 14 12 21     Assessment and Diagnosis   Status/Progress: Based on the examination today, the patient's problem(s) is/are inadequately controlled.  New problems have not been presented today.   Co-morbidities and Lack of compliance are not complicating management of the primary condition.  Number of separate conditions addressed during today's visit: 2 (ADHD uncontrolled, depression uncontrolled).  Are medication adjustments being made today: Yes.  Are referral(s) being ordered today: No.  Complexity (level) of medical decision making employed in the encounter: HIGH.    General Impression:    ICD-10-CM ICD-9-CM   1. Attention deficit hyperactivity disorder (ADHD), combined type  F90.2 314.01   2. Persistent depressive disorder  F34.1 300.4   3. AGATA (generalized anxiety disorder)  F41.1 300.02     Intervention/Counseling/Treatment Plan   Discontinue lisdexamphetamine due to lack of benefit  Start dexedrine 10mg daily, will titrate as needed, Discussed potential SE including but not limited to poor appetite, weight loss, insomnia, palpitations, chest pain, addictive behavior  Continue celexa 20mg daily  Recent labwork in EMR reviewed, CBC and CMP wnl, TSH wnl  UDS negative at initial visit  No need for PEC as pt is not an imminent danger to self or others or gravely disabled due to acute psychiatric illness  Discussed that pt should either call clinic for psychiatric crisis symptoms or present to nearest emergency room    Discussed with patient informed consent including diagnosis, risks and benefits of proposed treatment above vs. alternative treatments vs. no treatment, as well as serious and common side effects of these  treatments, and the inherent unpredictability of individual responses to these treatments. The patient expresses understanding of the above and displays the capacity to agree with this current plan. Patient also agrees that, currently, the benefits outweigh the risks and would like to pursue treatment at this time, and had no other questions.    Instructions:  Take all medications as prescribed.    Abstain from recreational drugs and alcohol.  Present to ED or call 911 for SI/HI plan or intent, psychosis, or medical emergency.    Return to Clinic: Follow up in about 6 weeks (around 12/20/2023).    Total time:   The total time for services performed on the date of the encounter (including review of prior visit notes, review of notes from other providers, review of results from laboratory/imaging studies, face-to-face time with patient, and time spent on other activities directly related to patient care): 30 minutes.    Miquel Be MD  [facility]

## 2023-11-10 ENCOUNTER — PATIENT MESSAGE (OUTPATIENT)
Dept: BEHAVIORAL HEALTH | Facility: CLINIC | Age: 33
End: 2023-11-10
Payer: COMMERCIAL

## 2023-11-10 DIAGNOSIS — F90.2 ATTENTION DEFICIT HYPERACTIVITY DISORDER (ADHD), COMBINED TYPE: Primary | ICD-10-CM

## 2023-11-13 RX ORDER — DEXTROAMPHETAMINE SULFATE 10 MG/1
10 CAPSULE, EXTENDED RELEASE ORAL DAILY
Qty: 14 CAPSULE | Refills: 0 | Status: SHIPPED | OUTPATIENT
Start: 2023-11-13 | End: 2023-11-21

## 2023-11-13 RX ORDER — DEXTROAMPHETAMINE SULFATE 10 MG/1
10 CAPSULE, EXTENDED RELEASE ORAL DAILY
Qty: 14 CAPSULE | Refills: 0 | Status: SHIPPED | OUTPATIENT
Start: 2023-11-13 | End: 2023-11-13 | Stop reason: SDUPTHER

## 2023-11-15 ENCOUNTER — OFFICE VISIT (OUTPATIENT)
Dept: FAMILY MEDICINE | Facility: CLINIC | Age: 33
End: 2023-11-15
Payer: COMMERCIAL

## 2023-11-15 VITALS
WEIGHT: 126.81 LBS | RESPIRATION RATE: 20 BRPM | BODY MASS INDEX: 23.34 KG/M2 | SYSTOLIC BLOOD PRESSURE: 100 MMHG | DIASTOLIC BLOOD PRESSURE: 65 MMHG | HEIGHT: 62 IN | TEMPERATURE: 98 F | OXYGEN SATURATION: 97 % | HEART RATE: 64 BPM

## 2023-11-15 DIAGNOSIS — G89.29 CHRONIC BILATERAL LOW BACK PAIN WITH BILATERAL SCIATICA: Primary | ICD-10-CM

## 2023-11-15 DIAGNOSIS — M54.42 CHRONIC BILATERAL LOW BACK PAIN WITH BILATERAL SCIATICA: Primary | ICD-10-CM

## 2023-11-15 DIAGNOSIS — L25.9 CONTACT DERMATITIS, UNSPECIFIED CONTACT DERMATITIS TYPE, UNSPECIFIED TRIGGER: ICD-10-CM

## 2023-11-15 DIAGNOSIS — M54.9 DORSALGIA, UNSPECIFIED: ICD-10-CM

## 2023-11-15 DIAGNOSIS — M54.41 CHRONIC BILATERAL LOW BACK PAIN WITH BILATERAL SCIATICA: Primary | ICD-10-CM

## 2023-11-15 PROCEDURE — 3074F SYST BP LT 130 MM HG: CPT | Mod: CPTII,,,

## 2023-11-15 PROCEDURE — 3074F PR MOST RECENT SYSTOLIC BLOOD PRESSURE < 130 MM HG: ICD-10-PCS | Mod: CPTII,,,

## 2023-11-15 PROCEDURE — 3008F BODY MASS INDEX DOCD: CPT | Mod: CPTII,,,

## 2023-11-15 PROCEDURE — 1160F PR REVIEW ALL MEDS BY PRESCRIBER/CLIN PHARMACIST DOCUMENTED: ICD-10-PCS | Mod: CPTII,,,

## 2023-11-15 PROCEDURE — 99214 OFFICE O/P EST MOD 30 MIN: CPT | Mod: PBBFAC,PN

## 2023-11-15 PROCEDURE — 1159F PR MEDICATION LIST DOCUMENTED IN MEDICAL RECORD: ICD-10-PCS | Mod: CPTII,,,

## 2023-11-15 PROCEDURE — 1159F MED LIST DOCD IN RCRD: CPT | Mod: CPTII,,,

## 2023-11-15 PROCEDURE — 1160F RVW MEDS BY RX/DR IN RCRD: CPT | Mod: CPTII,,,

## 2023-11-15 PROCEDURE — 99214 OFFICE O/P EST MOD 30 MIN: CPT | Mod: S$PBB,,,

## 2023-11-15 PROCEDURE — 99214 PR OFFICE/OUTPT VISIT, EST, LEVL IV, 30-39 MIN: ICD-10-PCS | Mod: S$PBB,,,

## 2023-11-15 PROCEDURE — 3078F PR MOST RECENT DIASTOLIC BLOOD PRESSURE < 80 MM HG: ICD-10-PCS | Mod: CPTII,,,

## 2023-11-15 PROCEDURE — 3008F PR BODY MASS INDEX (BMI) DOCUMENTED: ICD-10-PCS | Mod: CPTII,,,

## 2023-11-15 PROCEDURE — 3078F DIAST BP <80 MM HG: CPT | Mod: CPTII,,,

## 2023-11-15 RX ORDER — TRIAMCINOLONE ACETONIDE 1 MG/G
CREAM TOPICAL 2 TIMES DAILY
Qty: 45 G | Refills: 0 | Status: SHIPPED | OUTPATIENT
Start: 2023-11-15 | End: 2023-12-15

## 2023-11-15 RX ORDER — DICLOFENAC SODIUM 75 MG/1
75 TABLET, DELAYED RELEASE ORAL 2 TIMES DAILY PRN
Qty: 60 TABLET | Refills: 2 | Status: SHIPPED | OUTPATIENT
Start: 2023-11-15 | End: 2024-02-13

## 2023-11-15 RX ORDER — GABAPENTIN 300 MG/1
300 CAPSULE ORAL 3 TIMES DAILY PRN
Qty: 90 CAPSULE | Refills: 11 | Status: SHIPPED | OUTPATIENT
Start: 2023-11-15 | End: 2024-11-14

## 2023-11-15 NOTE — PROGRESS NOTES
"Patient Name: Nanci Clancy     : 1990    MRN: [id number]     Subjective:     Patient ID: Nanci Clancy is a 33 y.o. female.    Chief Complaint:   Chief Complaint   Patient presents with    Rash    Follow-up     6 month f/u. C/o rash to left side of neck x2-3 weeks. C/o lower back pain. States completed PT.         HPI: 11/15/2023: Patient presents for evaluation of low back problems. Symptoms have been present for a few years and include pain in bilateral lower back (aching, burning, and shooting in character; 8/10 in severity) and paresthesias in bilateral legs, left being worse than right. Initial inciting event: none. Alleviating factors identifiable by the patient are nothing. Aggravating factors identifiable by the patient are bending forwards, running, sitting, standing, and walking. Treatments initiated by the patient: she has participated in 6 weeks of conservative   Management with physical therapy at [facility], she also was discharged to a home therapy program, patient is a  and has been attempting to continue physical therapy and stretches for about 3 months   With no improvement. Previous lower back problems: none. Previous treatments:  physical therapy, NSAIDs, rest   With no drastic long-term improvement.    2023:  Will visit with patient today to discuss anxiety, increasing inattention as well as follow-up previous gastroenterologist complaints.  Patient states that she has recently started Linzess and has noticed a drastic improvement in her constipation.  Patient additionally states that she has been using the Vistaril again for as-needed anxiety in addition to the BusPar and states that her anxiety is "okay".  States that she has a lot of external stressors with her kids being home for spring break currently as well as she is currently enrolled in school to become a .  Patient does not know if it is her anxiety or if she " is ADHD but states that she has difficult time focusing in his often having to reread the same topics in his forgetful to things that were just told to her.  Notes that  points out that she can not remember simple conversations that they have.  Patient would like to wait until she establishes care with the psychiatrist on May 30th because she does not like taking lots of medication.  She is been trying to manage stress and anxiety with working out as well as diet.    02/08/2023:  Patient made this appointment today to discuss increased fatigue and inattention since utilizing Vistaril for anxiety.  Patient states that she noticed that she is having to use this medication more throughout the day and endorses increase in her anxiety.  Patient is amenable to starting daily medication and attempt to prevent anxiety from occurring.  At length discussion with patient about medication regimen    01/19/2023:  Virtual visit with patient today to discuss abdominal ultrasound was performed, patient has also establish care with gastroenterologist.  Patient states that she will be having a colonoscopy and endoscopy performed.  Patient is still having blood in her stool.  Patient states that she has noticed improvement in her anxiety since utilizing Vistaril more often.  She is satisfied with this medication currently would not like to make any changes or use a preventative medicine at this time.    10/21/2022:  Virtual visit today with patient to review labs, patient complaining additionally of abdominal bloating, occasional blood in stool. Patient states she has been eating healthy and only eats whole foods. She has had hemorrhoids in the past but does not notice any currently. Has annual appointment with gyn next week. Her Anxiety is still present but she has notice improvement in mood and anxiety since starting vistaril. Notes improved sleep.     09/15/2022:  Patient here to establish care today, she denies seeing a  general practitioner PCP and her daughter alive, she is established with gynecologist who performs her women's wellness for her.  She has follow-up with him beginning of October.  Patient today complains of chronic back pain after accident about 10 years ago, she has tried managing this herself with daily workout routine.  She is requesting further workup to help with her pain in get proper treatment.  Patient denies any bladder or bowel incontinence, saddle anesthesia or tingling of lower extremities.  Patient also complains of increased anxiety and outburst of anger.  Patient states that she is interested in starting psychotherapy to help with these issues but also may need medication to assist.  Patient denies SI/HI.        ROS:       12 point review of systems conducted, negative except as stated in the history of present illness. See HPI for details.    History:     Past Medical History:   Diagnosis Date    Anxiety     Depression         Past Surgical History:   Procedure Laterality Date     SECTION      HYSTERECTOMY      PARTIAL HYSTERECTOMY         History reviewed. No pertinent family history.     Social History     Tobacco Use    Smoking status: Never    Smokeless tobacco: Never   Substance and Sexual Activity    Alcohol use: Yes     Comment: Rarely    Drug use: Never    Sexual activity: Yes     Partners: Male       Current Outpatient Medications   Medication Instructions    citalopram (CELEXA) 20 mg, Oral, Daily    dextroamphetamine sulfate (DEXEDRINE SPANSULE) 10 mg, Oral, Daily    diclofenac (VOLTAREN) 75 mg, Oral, 2 times daily PRN    gabapentin (NEURONTIN) 300 mg, Oral, 3 times daily PRN    pantoprazole (PROTONIX) 40 mg, Oral, Every morning    PEPCID 40 mg, Oral, Nightly    triamcinolone acetonide 0.1% (KENALOG) 0.1 % cream Topical (Top), 2 times daily        Review of patient's allergies indicates:  No Known Allergies    Objective:     Visit Vitals  /65 (BP Location: Right arm, Patient  "Position: Sitting)   Pulse 64   Temp 98.1 °F (36.7 °C) (Oral)   Resp 20   Ht 5' 2" (1.575 m)   Wt 57.5 kg (126 lb 12.8 oz)   SpO2 97%   BMI 23.19 kg/m²       Physical Examination:     Physical Exam  Vitals reviewed.   Constitutional:       Appearance: Normal appearance. She is normal weight.   HENT:      Head: Normocephalic.      Right Ear: Tympanic membrane, ear canal and external ear normal.      Left Ear: Tympanic membrane, ear canal and external ear normal.      Nose: Nose normal.      Mouth/Throat:      Mouth: Mucous membranes are moist.      Pharynx: Oropharynx is clear.   Eyes:      Extraocular Movements: Extraocular movements intact.      Conjunctiva/sclera: Conjunctivae normal.      Pupils: Pupils are equal, round, and reactive to light.   Cardiovascular:      Rate and Rhythm: Normal rate and regular rhythm.      Pulses: Normal pulses.      Heart sounds: Normal heart sounds.   Pulmonary:      Effort: Pulmonary effort is normal.      Breath sounds: Normal breath sounds.   Abdominal:      General: Abdomen is flat. Bowel sounds are normal.      Palpations: Abdomen is soft.   Musculoskeletal:      Cervical back: Normal range of motion and neck supple.      Thoracic back: Spasms present. Decreased range of motion.      Lumbar back: Spasms present. Decreased range of motion. Positive right straight leg raise test and positive left straight leg raise test.   Skin:     General: Skin is warm and dry.   Neurological:      General: No focal deficit present.      Mental Status: She is alert and oriented to person, place, and time.   Psychiatric:         Mood and Affect: Mood normal.         Behavior: Behavior normal.         Lab Results:     Chemistry:  Lab Results   Component Value Date     09/15/2022    K 4.9 09/15/2022    CHLORIDE 106 09/15/2022    BUN 12.2 09/15/2022    CREATININE 0.76 09/15/2022    EGFRNORACEVR >60 09/15/2022    GLUCOSE 68 (L) 09/15/2022    CALCIUM 9.3 09/15/2022    ALKPHOS 64 09/15/2022    " LABPROT 7.0 09/15/2022    ALBUMIN 4.3 09/15/2022    BILIDIR 0.2 11/02/2021    IBILI 0.30 11/02/2021    AST 18 09/15/2022    ALT 19 09/15/2022    TSH 1.999 12/20/2022    SFCVDN2ODHD 0.88 09/15/2022        Lab Results   Component Value Date    HGBA1C 4.7 09/15/2022        Hematology:  Lab Results   Component Value Date    WBC 6.9 09/15/2022    HGB 14.2 09/15/2022    HCT 43.4 09/15/2022     09/15/2022       Lipid Panel:  Lab Results   Component Value Date    CHOL 162 09/15/2022    HDL 40 09/15/2022    .00 09/15/2022    TRIG 38 09/15/2022    TOTALCHOLEST 4 09/15/2022        Urine:  Lab Results   Component Value Date    COLORUA Yellow 09/15/2022    APPEARANCEUA Turbid (A) 09/15/2022    SGUA 1.028 09/15/2022    PHUA 6.0 09/15/2022    PROTEINUA Trace (A) 09/15/2022    GLUCOSEUA Negative 09/15/2022    KETONESUA Negative 09/15/2022    BLOODUA Negative 09/15/2022    NITRITESUA Negative 09/15/2022    LEUKOCYTESUR Negative 09/15/2022    RBCUA 0-2 09/15/2022    WBCUA 0-2 09/15/2022    BACTERIA Moderate (A) 09/15/2022    SQEPUA Few 09/15/2022        Assessment:          ICD-10-CM ICD-9-CM   1. Chronic bilateral low back pain with bilateral sciatica  M54.42 724.2    M54.41 724.3    G89.29 338.29   2. Dorsalgia, unspecified  M54.9 724.5   3. Contact dermatitis, unspecified contact dermatitis type, unspecified trigger  L25.9 692.9        Plan:     1. Chronic bilateral low back pain with bilateral sciatica  Overview:  Lower back stretches daily.  Avoid strenuous lifting, use proper body mechanics.  Heating pad, Ice pack, Biofreeze or Epsom salt baths as needed.  Exercise to strengthen core muscles to support your back.  PT Referral given.        Orders:  -     gabapentin (NEURONTIN) 300 MG capsule; Take 1 capsule (300 mg total) by mouth 3 (three) times daily as needed (nerve pain).  Dispense: 90 capsule; Refill: 11  -     diclofenac (VOLTAREN) 75 MG EC tablet; Take 1 tablet (75 mg total) by mouth 2 (two) times daily as  needed (pain).  Dispense: 60 tablet; Refill: 2    2. Dorsalgia, unspecified  -     MRI Lumbar Spine Without Contrast; Future; Expected date: 11/15/2023    3. Contact dermatitis, unspecified contact dermatitis type, unspecified trigger  -     triamcinolone acetonide 0.1% (KENALOG) 0.1 % cream; Apply topically 2 (two) times daily.  Dispense: 45 g; Refill: 0         Follow up in about 6 months (around 5/15/2024), or if symptoms worsen or fail to improve.    Future Appointments   Date Time Provider Department Center   1/3/2024  8:30 AM Miquel Be MD [facility]   5/15/2024  9:00 AM Tia De Paz NP [facility]   1/21/2025 10:00 AM Alec Kapadia MD [facility]        Tia De Paz NP

## 2023-11-21 RX ORDER — DEXMETHYLPHENIDATE HYDROCHLORIDE 10 MG/1
10 CAPSULE, EXTENDED RELEASE ORAL DAILY
Qty: 14 CAPSULE | Refills: 0 | Status: SHIPPED | OUTPATIENT
Start: 2023-11-21 | End: 2023-12-07 | Stop reason: ALTCHOICE

## 2023-12-05 ENCOUNTER — TELEPHONE (OUTPATIENT)
Dept: FAMILY MEDICINE | Facility: CLINIC | Age: 33
End: 2023-12-05
Payer: COMMERCIAL

## 2023-12-05 NOTE — TELEPHONE ENCOUNTER
Pt reports since starting focalin, she has been more aggressive and irritable. She has also noticed an increase in appetite. Pt also reports that she feels like the Celexa makes her overly tired in the morning. She stopped taking the medication for 2 days and felt great to see if there would be any noticeable difference. Pt is requesting to discontinue both medications and discuss other options.

## 2023-12-07 ENCOUNTER — PATIENT MESSAGE (OUTPATIENT)
Dept: FAMILY MEDICINE | Facility: CLINIC | Age: 33
End: 2023-12-07
Payer: COMMERCIAL

## 2023-12-07 DIAGNOSIS — F90.2 ATTENTION DEFICIT HYPERACTIVITY DISORDER (ADHD), COMBINED TYPE: Primary | ICD-10-CM

## 2023-12-07 RX ORDER — LISDEXAMFETAMINE DIMESYLATE 50 MG/1
50 CAPSULE ORAL EVERY MORNING
Qty: 30 CAPSULE | Refills: 0 | Status: SHIPPED | OUTPATIENT
Start: 2023-12-07 | End: 2024-01-03

## 2023-12-07 NOTE — PROGRESS NOTES
Spoke to patient by phone regarding her current medication regimen.  She reported Focalin was causing excessive irritability and that Celexa was causing her to feel intolerably tired.  Wants to change medication regimen to address these symptoms and to improve effect.  Noted most benefit in the past from Vyvanse, but noticed, when switching to generic products, she noticed minimal effect.  Would like to try Vyvanse again.  Discussed alternative options, will consider viloxazine vs dextrostat as secondary options if needed.  We will assess mood response to reinitiating Vyvanse and treat a separate problem if needed.  Patient in agreement with this plan.  All questions answered.

## 2024-01-03 ENCOUNTER — OFFICE VISIT (OUTPATIENT)
Dept: BEHAVIORAL HEALTH | Facility: CLINIC | Age: 34
End: 2024-01-03
Payer: COMMERCIAL

## 2024-01-03 VITALS
HEART RATE: 74 BPM | BODY MASS INDEX: 23.27 KG/M2 | DIASTOLIC BLOOD PRESSURE: 64 MMHG | SYSTOLIC BLOOD PRESSURE: 97 MMHG | WEIGHT: 127.19 LBS | TEMPERATURE: 99 F

## 2024-01-03 DIAGNOSIS — F41.1 GAD (GENERALIZED ANXIETY DISORDER): ICD-10-CM

## 2024-01-03 DIAGNOSIS — F34.1 PERSISTENT DEPRESSIVE DISORDER: Primary | ICD-10-CM

## 2024-01-03 DIAGNOSIS — F90.2 ATTENTION DEFICIT HYPERACTIVITY DISORDER (ADHD), COMBINED TYPE: ICD-10-CM

## 2024-01-03 PROCEDURE — 99213 OFFICE O/P EST LOW 20 MIN: CPT | Mod: PBBFAC,PN | Performed by: STUDENT IN AN ORGANIZED HEALTH CARE EDUCATION/TRAINING PROGRAM

## 2024-01-03 PROCEDURE — 3078F DIAST BP <80 MM HG: CPT | Mod: CPTII,,, | Performed by: STUDENT IN AN ORGANIZED HEALTH CARE EDUCATION/TRAINING PROGRAM

## 2024-01-03 PROCEDURE — 3008F BODY MASS INDEX DOCD: CPT | Mod: CPTII,,, | Performed by: STUDENT IN AN ORGANIZED HEALTH CARE EDUCATION/TRAINING PROGRAM

## 2024-01-03 PROCEDURE — 1160F RVW MEDS BY RX/DR IN RCRD: CPT | Mod: CPTII,,, | Performed by: STUDENT IN AN ORGANIZED HEALTH CARE EDUCATION/TRAINING PROGRAM

## 2024-01-03 PROCEDURE — 3074F SYST BP LT 130 MM HG: CPT | Mod: CPTII,,, | Performed by: STUDENT IN AN ORGANIZED HEALTH CARE EDUCATION/TRAINING PROGRAM

## 2024-01-03 PROCEDURE — 1159F MED LIST DOCD IN RCRD: CPT | Mod: CPTII,,, | Performed by: STUDENT IN AN ORGANIZED HEALTH CARE EDUCATION/TRAINING PROGRAM

## 2024-01-03 PROCEDURE — 99214 OFFICE O/P EST MOD 30 MIN: CPT | Mod: AF,HB,S$PBB, | Performed by: STUDENT IN AN ORGANIZED HEALTH CARE EDUCATION/TRAINING PROGRAM

## 2024-01-03 RX ORDER — DEXTROAMPHETAMINE SACCHARATE, AMPHETAMINE ASPARTATE, DEXTROAMPHETAMINE SULFATE AND AMPHETAMINE SULFATE 3.75; 3.75; 3.75; 3.75 MG/1; MG/1; MG/1; MG/1
15 TABLET ORAL 2 TIMES DAILY
Qty: 30 TABLET | Refills: 0 | Status: SHIPPED | OUTPATIENT
Start: 2024-01-03 | End: 2024-01-19 | Stop reason: DRUGHIGH

## 2024-01-03 NOTE — PROGRESS NOTES
"Outpatient Psychiatry Follow-Up Visit    1/3/2024    Clinical Status of Patient:  Outpatient (Ambulatory)    Chief Complaint:  Nanci Clancy is a 33 y.o. female who presents today for follow-up of depression, anxiety, and attention problems. Patient last seen for follow-up on 11/8/2023. Met with patient.      Interval History and Content of Current Session:  Interim Events/Subjective Report/Content of Current Session:   Pt reports doing "not good"  overall.  Reports "horrible" mood, endorses feeling depressed, elevated anxiety.  Denies panic attacks.  Sleep poor, prolonged latency, 5-6 hrs nightly. Appetite increased, weight increasing.  Energy low, motivation poor, concentration poor.  Endorses irritability, denies hopelessness.  Denies SI/HI/AVH/paranoia, denies plan or desire for self harm or harm to others.  Denies SE from current regimen. Denies change in chronic somatic complaints. Pt voices desire to adjust regimen to address ongoing symptoms.      Psychiatric Review of Systems-is patient experiencing or having changes in  See HPI above.     Review of Systems   PSYCHIATRIC: Pertinant items are noted in the narrative.  CONSTITUTIONAL: No weight gain or loss.  MUSCULOSKELETAL: +back pain, denies myalgias  NEUROLOGIC: No weakness, sensory changes, seizures, confusion, memory loss, tremor or other abnormal movements.   CARDIAC: No CP, no palpitations  RESPIRATORY: No shortness of breath.  CARDIOVASCULAR: No tachycardia or chest pain.  GASTROINTESTINAL: No nausea, vomiting, pain, constipation or diarrhea.    Past Medical, Family and Social History: The patient's past medical, family and social history have been reviewed and updated as appropriate within the electronic medical record - see encounter notes.    Compliance: good    Side effects: denies    Risk Parameters:  Patient reports no suicidal ideation  Patient reports no homicidal ideation  Patient reports no self-injurious behavior  Patient reports " "no violent behavior    Exam (detailed: at least 9 elements; comprehensive: all 15 elements)   Constitutional  Vitals:  Most recent vital signs, dated less than 90 days prior to this appointment, were reviewed.     Vitals:    01/03/24 0912   BP: 97/64   Pulse: 74   Temp: 98.7 °F (37.1 °C)   TempSrc: Oral   Weight: 57.7 kg (127 lb 3.2 oz)          General:   Constitutional: No acute distress, appears stated age, casually dressed    Neurologic:   Motor: moves upper extremities spontaneously and without difficulty, no abnormal involuntary movements observed  Gait: normal gait and station    Mental status examination:   Appearance: appears stated age, casually dressed, no acute distress  Behavior: unremarkable for situation, calm and cooperative  Mood: "not good"  Affect: mood congruent, constricted, anxious appearing  Thought process: linear and goal directed  Associations: appropriate for conversation  Thought content: no plan or desire for self harm or harm to others, denies paranoia, no delusional ideation volunteered  Perceptions: denies hallucinations or other altered perceptions  Orientation: oriented to day of week, month, year, location, and situation  Language: English, fluid  Attention: able to attend to interview  Insight: good  Judgement: good    PHQ9:  Over the last two weeks how often have you been bothered by little interest or pleasure in doing things: 3  Over the last two weeks how often have you been bothered by feeling down, depressed or hopeless: 2  PHQ-2 Total Score: 5  PHQ-9 Score: 17  PHQ-9 Interpretation: Moderately Severe        1/3/2024     9:35 AM 11/8/2023     3:19 PM 9/13/2023     3:41 PM   GAD7   1. Feeling nervous, anxious, or on edge? 3 3 1   2. Not being able to stop or control worrying? 2 3 3   3. Worrying too much about different things? 3 3 3   4. Trouble relaxing? 2 3 3   5. Being so restless that it is hard to sit still? 2 0 1   6. Becoming easily annoyed or irritable? 3 2 1   7. " Feeling afraid as if something awful might happen? 0 0 0   8. If you checked off any problems, how difficult have these problems made it for you to do your work, take care of things at home, or get along with other people? 3 2 1   AGATA-7 Score 15 14 12     Assessment and Diagnosis   Status/Progress: Based on the examination today, the patient's problem(s) is/are inadequately controlled.  New problems have not been presented today.   Co-morbidities and Lack of compliance are not complicating management of the primary condition.  Number of separate conditions addressed during today's visit: 3 (mood uncontrolled, anxiety uncontrolled, ADHD uncontrolled).  Are medication adjustments being made today: Yes.  Are referral(s) being ordered today: No.  Complexity (level) of medical decision making employed in the encounter: MODERATE.    General Impression:    ICD-10-CM ICD-9-CM   1. Persistent depressive disorder  F34.1 300.4   2. AGATA (generalized anxiety disorder)  F41.1 300.02   3. Attention deficit hyperactivity disorder (ADHD), combined type  F90.2 314.01     Intervention/Counseling/Treatment Plan   Start adderall 10mg daily  Anticipate improvement in mood/anxiety symptoms with psychostimulant  No need for PEC as pt is not an imminent danger to self or others or gravely disabled due to acute psychiatric illness  Discussed that pt should either call clinic for psychiatric crisis symptoms or present to nearest emergency room    Discussed with patient informed consent including diagnosis, risks and benefits of proposed treatment above vs. alternative treatments vs. no treatment, as well as serious and common side effects of these treatments, and the inherent unpredictability of individual responses to these treatments. The patient expresses understanding of the above and displays the capacity to agree with this current plan. Patient also agrees that, currently, the benefits outweigh the risks and would like to pursue  treatment at this time, and had no other questions.    Instructions:  Take all medications as prescribed.    Abstain from recreational drugs and alcohol.  Present to ED or call 911 for SI/HI plan or intent, psychosis, or medical emergency.    Return to Clinic: Follow up in about 4 weeks (around 1/31/2024).    Total time:   The total time for services performed on the date of the encounter (including review of prior visit notes, review of notes from other providers, review of results from laboratory/imaging studies, face-to-face time with patient, and time spent on other activities directly related to patient care): 24 minutes.    Miquel Be MD  [facility]

## 2024-01-18 ENCOUNTER — TELEPHONE (OUTPATIENT)
Dept: BEHAVIORAL HEALTH | Facility: CLINIC | Age: 34
End: 2024-01-18
Payer: COMMERCIAL

## 2024-01-18 ENCOUNTER — TELEPHONE (OUTPATIENT)
Dept: FAMILY MEDICINE | Facility: CLINIC | Age: 34
End: 2024-01-18
Payer: COMMERCIAL

## 2024-01-18 DIAGNOSIS — F90.2 ATTENTION DEFICIT HYPERACTIVITY DISORDER (ADHD), COMBINED TYPE: Primary | ICD-10-CM

## 2024-01-18 DIAGNOSIS — F90.2 ATTENTION DEFICIT HYPERACTIVITY DISORDER (ADHD), COMBINED TYPE: ICD-10-CM

## 2024-01-18 RX ORDER — DEXTROAMPHETAMINE SACCHARATE, AMPHETAMINE ASPARTATE, DEXTROAMPHETAMINE SULFATE AND AMPHETAMINE SULFATE 3.75; 3.75; 3.75; 3.75 MG/1; MG/1; MG/1; MG/1
15 TABLET ORAL 2 TIMES DAILY
Qty: 30 TABLET | Refills: 0 | Status: CANCELLED | OUTPATIENT
Start: 2024-01-18

## 2024-01-19 RX ORDER — DEXTROAMPHETAMINE SACCHARATE, AMPHETAMINE ASPARTATE, DEXTROAMPHETAMINE SULFATE AND AMPHETAMINE SULFATE 5; 5; 5; 5 MG/1; MG/1; MG/1; MG/1
1 TABLET ORAL 2 TIMES DAILY
Qty: 30 TABLET | Refills: 0 | Status: SHIPPED | OUTPATIENT
Start: 2024-01-19 | End: 2024-02-07

## 2024-01-19 NOTE — TELEPHONE ENCOUNTER
Pt called to report inadequate response to adderall, notes continued difficulty with irritability and motivation.  Provides IC for dose adjustment.  Will increase to adderall IR 20mg bid.

## 2024-02-06 ENCOUNTER — PATIENT MESSAGE (OUTPATIENT)
Dept: BEHAVIORAL HEALTH | Facility: CLINIC | Age: 34
End: 2024-02-06
Payer: COMMERCIAL

## 2024-02-07 ENCOUNTER — OFFICE VISIT (OUTPATIENT)
Dept: BEHAVIORAL HEALTH | Facility: CLINIC | Age: 34
End: 2024-02-07
Payer: COMMERCIAL

## 2024-02-07 VITALS
BODY MASS INDEX: 24.26 KG/M2 | HEART RATE: 68 BPM | WEIGHT: 131.81 LBS | TEMPERATURE: 98 F | DIASTOLIC BLOOD PRESSURE: 68 MMHG | SYSTOLIC BLOOD PRESSURE: 104 MMHG | HEIGHT: 62 IN

## 2024-02-07 DIAGNOSIS — F41.1 GAD (GENERALIZED ANXIETY DISORDER): ICD-10-CM

## 2024-02-07 DIAGNOSIS — F90.2 ATTENTION DEFICIT HYPERACTIVITY DISORDER (ADHD), COMBINED TYPE: ICD-10-CM

## 2024-02-07 DIAGNOSIS — F33.1 MODERATE EPISODE OF RECURRENT MAJOR DEPRESSIVE DISORDER: Primary | ICD-10-CM

## 2024-02-07 PROCEDURE — 3078F DIAST BP <80 MM HG: CPT | Mod: CPTII,,, | Performed by: STUDENT IN AN ORGANIZED HEALTH CARE EDUCATION/TRAINING PROGRAM

## 2024-02-07 PROCEDURE — 1160F RVW MEDS BY RX/DR IN RCRD: CPT | Mod: CPTII,,, | Performed by: STUDENT IN AN ORGANIZED HEALTH CARE EDUCATION/TRAINING PROGRAM

## 2024-02-07 PROCEDURE — 99214 OFFICE O/P EST MOD 30 MIN: CPT | Mod: S$PBB,,, | Performed by: STUDENT IN AN ORGANIZED HEALTH CARE EDUCATION/TRAINING PROGRAM

## 2024-02-07 PROCEDURE — 3008F BODY MASS INDEX DOCD: CPT | Mod: CPTII,,, | Performed by: STUDENT IN AN ORGANIZED HEALTH CARE EDUCATION/TRAINING PROGRAM

## 2024-02-07 PROCEDURE — 3074F SYST BP LT 130 MM HG: CPT | Mod: CPTII,,, | Performed by: STUDENT IN AN ORGANIZED HEALTH CARE EDUCATION/TRAINING PROGRAM

## 2024-02-07 PROCEDURE — 99213 OFFICE O/P EST LOW 20 MIN: CPT | Mod: PBBFAC,PN | Performed by: STUDENT IN AN ORGANIZED HEALTH CARE EDUCATION/TRAINING PROGRAM

## 2024-02-07 PROCEDURE — 1159F MED LIST DOCD IN RCRD: CPT | Mod: CPTII,,, | Performed by: STUDENT IN AN ORGANIZED HEALTH CARE EDUCATION/TRAINING PROGRAM

## 2024-02-07 RX ORDER — ARIPIPRAZOLE 2 MG/1
2 TABLET ORAL DAILY
Qty: 30 TABLET | Refills: 5 | Status: SHIPPED | OUTPATIENT
Start: 2024-02-07 | End: 2024-03-25 | Stop reason: SDUPTHER

## 2024-02-07 RX ORDER — DULOXETIN HYDROCHLORIDE 30 MG/1
30 CAPSULE, DELAYED RELEASE ORAL DAILY
Qty: 30 CAPSULE | Refills: 5 | Status: SHIPPED | OUTPATIENT
Start: 2024-02-07 | End: 2024-05-15 | Stop reason: SDUPTHER

## 2024-02-07 RX ORDER — DEXTROAMPHETAMINE SACCHARATE, AMPHETAMINE ASPARTATE, DEXTROAMPHETAMINE SULFATE AND AMPHETAMINE SULFATE 7.5; 7.5; 7.5; 7.5 MG/1; MG/1; MG/1; MG/1
30 TABLET ORAL 2 TIMES DAILY
Qty: 60 TABLET | Refills: 0 | Status: SHIPPED | OUTPATIENT
Start: 2024-02-07 | End: 2024-03-20 | Stop reason: SDUPTHER

## 2024-02-07 NOTE — PROGRESS NOTES
"Outpatient Psychiatry Follow-Up Visit    2/7/2024    Clinical Status of Patient:  Outpatient (Ambulatory)    Chief Complaint:  Nanci Clancy is a 33 y.o. female who presents today for follow-up of depression, anxiety, and attention problems. Patient last seen for follow-up on 1/3/2024. Met with patient.      Interval History and Content of Current Session:  Interim Events/Subjective Report/Content of Current Session:   Pt reports doing "not good"  overall.  Reports down mood, elevated anxiety.  Sleep "not horrible." Appetite increased, weight increasing.  Energy low, motivation low.  Endorses irritability, endorses hopelessness.  Denies SI/HI/AVH/paranoia, denies plan or desire for self harm or harm to others.  Says that her  has noticed change in behavior.  Has been isolating herself from friends and not enjoying things she normally does.  Denies SE from current regimen. Denies change in chronic somatic complaints. Pt voices desire to adjust regimen to address ongoing symptoms.      Psychiatric Review of Systems-is patient experiencing or having changes in  See HPI above.     Review of Systems   PSYCHIATRIC: Pertinant items are noted in the narrative.  CONSTITUTIONAL: No weight gain or loss.  MUSCULOSKELETAL: +back pain, denies myalgias  NEUROLOGIC: No weakness, sensory changes, seizures, confusion, memory loss, tremor or other abnormal movements.   CARDIAC: No CP, no palpitations  RESPIRATORY: No shortness of breath.  CARDIOVASCULAR: No tachycardia or chest pain.  GASTROINTESTINAL: No nausea, vomiting, pain, constipation or diarrhea.    Past Medical, Family and Social History: The patient's past medical, family and social history have been reviewed and updated as appropriate within the electronic medical record - see encounter notes.    Compliance: good    Side effects: denies    Risk Parameters:  Patient reports no suicidal ideation  Patient reports no homicidal ideation  Patient reports no " "self-injurious behavior  Patient reports no violent behavior    Exam (detailed: at least 9 elements; comprehensive: all 15 elements)   Constitutional  Vitals:  Most recent vital signs, dated less than 90 days prior to this appointment, were reviewed.     Vitals:    02/07/24 1500   BP: 104/68   Pulse: 68   Temp: 97.6 °F (36.4 °C)   TempSrc: Oral   Weight: 59.8 kg (131 lb 12.8 oz)   Height: 5' 2" (1.575 m)          General:   Constitutional: No acute distress, appears stated age, casually dressed    Neurologic:   Motor: moves upper extremities spontaneously and without difficulty, no abnormal involuntary movements observed  Gait: normal gait and station    Mental status examination:   Appearance: appears stated age, casually dressed, no acute distress  Behavior: unremarkable for situation, calm and cooperative  Mood: "not good"  Affect: mood congruent, constricted, anxious appearing  Thought process: linear and goal directed  Associations: appropriate for conversation  Thought content: no plan or desire for self harm or harm to others, denies paranoia, no delusional ideation volunteered  Perceptions: denies hallucinations or other altered perceptions  Orientation: oriented to day of week, month, year, location, and situation  Language: English, fluid  Attention: able to attend to interview  Insight: good  Judgement: good    PHQ9:  Over the last two weeks how often have you been bothered by little interest or pleasure in doing things: 2  Over the last two weeks how often have you been bothered by feeling down, depressed or hopeless: 3  PHQ-2 Total Score: 5  PHQ-9 Score: 18  PHQ-9 Interpretation: Moderately Severe        2/7/2024     3:04 PM 1/3/2024     9:35 AM 11/8/2023     3:19 PM   GAD7   1. Feeling nervous, anxious, or on edge? 3 3 3   2. Not being able to stop or control worrying? 3 2 3   3. Worrying too much about different things? 3 3 3   4. Trouble relaxing? 3 2 3   5. Being so restless that it is hard to sit " still? 3 2 0   6. Becoming easily annoyed or irritable? 3 3 2   7. Feeling afraid as if something awful might happen? 0 0 0   8. If you checked off any problems, how difficult have these problems made it for you to do your work, take care of things at home, or get along with other people? 3 3 2   AGATA-7 Score 18 15 14     Assessment and Diagnosis   Status/Progress: Based on the examination today, the patient's problem(s) is/are inadequately controlled.  New problems have not been presented today.   Co-morbidities and Lack of compliance are not complicating management of the primary condition.  Number of separate conditions addressed during today's visit: 3 (mood uncontrolled, anxiety uncontrolled, ADHD uncontrolled).  Are medication adjustments being made today: Yes.  Are referral(s) being ordered today: No.  Complexity (level) of medical decision making employed in the encounter: MODERATE.    General Impression:    ICD-10-CM ICD-9-CM   1. Moderate episode of recurrent major depressive disorder  F33.1 296.32   2. AGATA (generalized anxiety disorder)  F41.1 300.02   3. Attention deficit hyperactivity disorder (ADHD), combined type  F90.2 314.01     Intervention/Counseling/Treatment Plan   Increase adderall to 30mg bid  Start cymbalta 30mg daily, Discussed potential SE including but not limited to GI upset, headache, HTN, tachycardia, suicidal thinking  Start abilify 2mg daily for mood augmentation, Discussed potential SE including but not limited to sedation, metabolic disturbance, weight gain, movement disorder (including TD)  No need for PEC as pt is not an imminent danger to self or others or gravely disabled due to acute psychiatric illness  Discussed that pt should either call clinic for psychiatric crisis symptoms or present to nearest emergency room    Discussed with patient informed consent including diagnosis, risks and benefits of proposed treatment above vs. alternative treatments vs. no treatment, as well as  serious and common side effects of these treatments, and the inherent unpredictability of individual responses to these treatments. The patient expresses understanding of the above and displays the capacity to agree with this current plan. Patient also agrees that, currently, the benefits outweigh the risks and would like to pursue treatment at this time, and had no other questions.    Instructions:  Take all medications as prescribed.    Abstain from recreational drugs and alcohol.  Present to ED or call 911 for SI/HI plan or intent, psychosis, or medical emergency.    Return to Clinic: Follow up in about 4 weeks (around 3/6/2024).    Total time:   The total time for services performed on the date of the encounter (including review of prior visit notes, review of notes from other providers, review of results from laboratory/imaging studies, face-to-face time with patient, and time spent on other activities directly related to patient care): 30 minutes.    Miquel Be MD  [facility]

## 2024-02-09 PROBLEM — F33.1 MODERATE EPISODE OF RECURRENT MAJOR DEPRESSIVE DISORDER: Status: ACTIVE | Noted: 2023-05-30

## 2024-02-14 ENCOUNTER — PATIENT MESSAGE (OUTPATIENT)
Dept: FAMILY MEDICINE | Facility: CLINIC | Age: 34
End: 2024-02-14
Payer: COMMERCIAL

## 2024-02-28 ENCOUNTER — OFFICE VISIT (OUTPATIENT)
Dept: BEHAVIORAL HEALTH | Facility: CLINIC | Age: 34
End: 2024-02-28
Payer: COMMERCIAL

## 2024-02-28 DIAGNOSIS — F33.1 MODERATE EPISODE OF RECURRENT MAJOR DEPRESSIVE DISORDER: ICD-10-CM

## 2024-02-28 DIAGNOSIS — F41.1 GAD (GENERALIZED ANXIETY DISORDER): ICD-10-CM

## 2024-02-28 DIAGNOSIS — F90.2 ATTENTION DEFICIT HYPERACTIVITY DISORDER (ADHD), COMBINED TYPE: Primary | ICD-10-CM

## 2024-02-28 PROCEDURE — 1159F MED LIST DOCD IN RCRD: CPT | Mod: CPTII,95,, | Performed by: STUDENT IN AN ORGANIZED HEALTH CARE EDUCATION/TRAINING PROGRAM

## 2024-02-28 PROCEDURE — 1160F RVW MEDS BY RX/DR IN RCRD: CPT | Mod: CPTII,95,, | Performed by: STUDENT IN AN ORGANIZED HEALTH CARE EDUCATION/TRAINING PROGRAM

## 2024-02-28 PROCEDURE — 99213 OFFICE O/P EST LOW 20 MIN: CPT | Mod: 95,,, | Performed by: STUDENT IN AN ORGANIZED HEALTH CARE EDUCATION/TRAINING PROGRAM

## 2024-02-28 NOTE — PROGRESS NOTES
"Outpatient Psychiatry Follow-Up Visit    2/28/2024    Clinical Status of Patient:  Outpatient (Ambulatory)    Chief Complaint:  Nanci Clancy is a 33 y.o. female who presents today for follow-up of depression, anxiety, and attention problems. Patient last seen for follow-up on 2/7/2024. Met with patient.      TELE PSYCHIATRY Disclaimer   *The patient was informed despite using HIPPA compliant technology there may be risks including security breach, technological failure, inability to perform a comprehensive physical exam which could delay or prevent an accurate diagnosis, and potential complications from treatment decisions rendered over a telemedical platform.   The patient was also informed of the relationship between the physician and patient and the respective role of any other health care provider with respect to management of the patient; and notified that the pt may decline to receive medical services by telemedicine and may withdraw from such care at any time.     Patient's Current location: [address] [place] [address]     In Case of Emergency pts next of kin  Name:Keith Clancy  Phone number:    [phone]   Visit type: Virtual visit with synchronous audio and video  Total time spent with patient: 20 minutes    Interval History and Content of Current Session:  Interim Events/Subjective Report/Content of Current Session:   Pt reports doing "ok"  overall.  Reports "not different" mood, though notes feeling less depressed, "not as bad" anxiety.  Sleep broken and non-restorative. Appetite increased, weight increased.  Energy low, motivation low, concentration "not great".  Endorses irritability (particularly when stimulant wears off), denies hopelessness.  Denies SI/HI/AVH/paranoia, denies plan or desire for self harm or harm to others.  Reports SE from current regimen: tiredness and nausea from abilify/cymbalta. Denies change in chronic somatic complaints. Pt happy with current " "regimen and wants to continue.     Psychiatric Review of Systems-is patient experiencing or having changes in  See HPI above.     Review of Systems   PSYCHIATRIC: Pertinant items are noted in the narrative.  CONSTITUTIONAL: No weight gain or loss.  MUSCULOSKELETAL: +back pain, denies myalgias  NEUROLOGIC: No weakness, sensory changes, seizures, confusion, memory loss, tremor or other abnormal movements.   CARDIAC: No CP, no palpitations  RESPIRATORY: No shortness of breath.  CARDIOVASCULAR: No tachycardia or chest pain.  GASTROINTESTINAL: No nausea, vomiting, pain, constipation or diarrhea.    Past Medical, Family and Social History: The patient's past medical, family and social history have been reviewed and updated as appropriate within the electronic medical record - see encounter notes.    Compliance: good    Side effects: denies    Risk Parameters:  Patient reports no suicidal ideation  Patient reports no homicidal ideation  Patient reports no self-injurious behavior  Patient reports no violent behavior    Exam (detailed: at least 9 elements; comprehensive: all 15 elements)   Constitutional  Vitals:  Most recent vital signs, dated less than 90 days prior to this appointment, were reviewed.     There were no vitals filed for this visit.         General:   Constitutional: No acute distress, appears stated age, casually dressed    Neurologic:   Motor: moves upper extremities spontaneously and without difficulty, no abnormal involuntary movements observed  Gait: [facility] 2/2 virtual visit    Mental status examination:   Appearance: appears stated age, casually dressed, no acute distress  Behavior: unremarkable for situation, calm and cooperative  Mood: "ok"  Affect: mood congruent, constricted, anxious appearing  Thought process: linear and goal directed  Associations: appropriate for conversation  Thought content: no plan or desire for self harm or harm to others, denies paranoia, no delusional ideation " volunteered  Perceptions: denies hallucinations or other altered perceptions  Orientation: oriented to day of week, month, year, location, and situation  Language: English, fluid  Attention: able to attend to interview  Insight: good  Judgement: good    PHQ9:  Over the last two weeks how often have you been bothered by little interest or pleasure in doing things: 2  Over the last two weeks how often have you been bothered by feeling down, depressed or hopeless: 3  PHQ-2 Total Score: 5  PHQ-9 Score: 18  PHQ-9 Interpretation: Moderately Severe        2/7/2024     3:04 PM 1/3/2024     9:35 AM 11/8/2023     3:19 PM   GAD7   1. Feeling nervous, anxious, or on edge? 3 3 3   2. Not being able to stop or control worrying? 3 2 3   3. Worrying too much about different things? 3 3 3   4. Trouble relaxing? 3 2 3   5. Being so restless that it is hard to sit still? 3 2 0   6. Becoming easily annoyed or irritable? 3 3 2   7. Feeling afraid as if something awful might happen? 0 0 0   8. If you checked off any problems, how difficult have these problems made it for you to do your work, take care of things at home, or get along with other people? 3 3 2   AGATA-7 Score 18 15 14     Assessment and Diagnosis   Status/Progress: Based on the examination today, the patient's problem(s) is/are adequately but not ideally controlled.  New problems have not been presented today.   Co-morbidities and Lack of compliance are not complicating management of the primary condition.  Number of separate conditions addressed during today's visit: 3 (mood improved slightly, anxiety improved slightly, ADHD fair control).  Are medication adjustments being made today: No.  Are referral(s) being ordered today: No.  Complexity (level) of medical decision making employed in the encounter: MODERATE.    General Impression:    ICD-10-CM ICD-9-CM   1. Attention deficit hyperactivity disorder (ADHD), combined type  F90.2 314.01   2. Moderate episode of recurrent  major depressive disorder  F33.1 296.32   3. AGATA (generalized anxiety disorder)  F41.1 300.02     Intervention/Counseling/Treatment Plan   Continue adderall 30mg bid  Continue cymbalta 30mg daily  Continue abilify 2mg daily for mood augmentation  No need for PEC as pt is not an imminent danger to self or others or gravely disabled due to acute psychiatric illness  Discussed that pt should either call clinic for psychiatric crisis symptoms or present to nearest emergency room    Discussed with patient informed consent including diagnosis, risks and benefits of proposed treatment above vs. alternative treatments vs. no treatment, as well as serious and common side effects of these treatments, and the inherent unpredictability of individual responses to these treatments. The patient expresses understanding of the above and displays the capacity to agree with this current plan. Patient also agrees that, currently, the benefits outweigh the risks and would like to pursue treatment at this time, and had no other questions.    Instructions:  Take all medications as prescribed.    Abstain from recreational drugs and alcohol.  Present to ED or call 911 for SI/HI plan or intent, psychosis, or medical emergency.    Return to Clinic: Follow up in about 6 weeks (around 4/10/2024).    Total time:   The total time for services performed on the date of the encounter (including review of prior visit notes, review of notes from other providers, review of results from laboratory/imaging studies, face-to-face time with patient, and time spent on other activities directly related to patient care): 20 minutes.    Miquel Be MD  [facility]

## 2024-03-07 ENCOUNTER — HOSPITAL ENCOUNTER (OUTPATIENT)
Dept: RADIOLOGY | Facility: HOSPITAL | Age: 34
Discharge: HOME | End: 2024-03-07
Payer: COMMERCIAL

## 2024-03-07 DIAGNOSIS — M54.9 DORSALGIA, UNSPECIFIED: ICD-10-CM

## 2024-03-07 PROCEDURE — 72148 MRI LUMBAR SPINE W/O DYE: CPT | Mod: TC

## 2024-03-13 ENCOUNTER — TELEPHONE (OUTPATIENT)
Dept: FAMILY MEDICINE | Facility: CLINIC | Age: 34
End: 2024-03-13

## 2024-03-19 ENCOUNTER — PATIENT MESSAGE (OUTPATIENT)
Dept: FAMILY MEDICINE | Facility: CLINIC | Age: 34
End: 2024-03-19
Payer: COMMERCIAL

## 2024-03-19 DIAGNOSIS — M54.41 CHRONIC BILATERAL LOW BACK PAIN WITH BILATERAL SCIATICA: Primary | ICD-10-CM

## 2024-03-19 DIAGNOSIS — M54.42 CHRONIC BILATERAL LOW BACK PAIN WITH BILATERAL SCIATICA: Primary | ICD-10-CM

## 2024-03-19 DIAGNOSIS — G89.29 CHRONIC BILATERAL LOW BACK PAIN WITH BILATERAL SCIATICA: Primary | ICD-10-CM

## 2024-03-20 DIAGNOSIS — F90.2 ATTENTION DEFICIT HYPERACTIVITY DISORDER (ADHD), COMBINED TYPE: ICD-10-CM

## 2024-03-20 DIAGNOSIS — F33.1 MODERATE EPISODE OF RECURRENT MAJOR DEPRESSIVE DISORDER: ICD-10-CM

## 2024-03-20 RX ORDER — DEXTROAMPHETAMINE SACCHARATE, AMPHETAMINE ASPARTATE, DEXTROAMPHETAMINE SULFATE AND AMPHETAMINE SULFATE 7.5; 7.5; 7.5; 7.5 MG/1; MG/1; MG/1; MG/1
30 TABLET ORAL 2 TIMES DAILY
Qty: 60 TABLET | Refills: 0 | Status: SHIPPED | OUTPATIENT
Start: 2024-03-20 | End: 2024-03-25 | Stop reason: SDUPTHER

## 2024-03-20 NOTE — TELEPHONE ENCOUNTER
Received refill request for adderall.  PDMP reviewed and demonstrated no abnormal filling patterns.  Refill submitted as requested.  Will follow up with patient as currently scheduled.

## 2024-03-21 ENCOUNTER — TELEPHONE (OUTPATIENT)
Dept: FAMILY MEDICINE | Facility: CLINIC | Age: 34
End: 2024-03-21
Payer: COMMERCIAL

## 2024-03-21 DIAGNOSIS — M54.42 CHRONIC BILATERAL LOW BACK PAIN WITH BILATERAL SCIATICA: Primary | ICD-10-CM

## 2024-03-21 DIAGNOSIS — G89.29 CHRONIC BILATERAL LOW BACK PAIN WITH BILATERAL SCIATICA: Primary | ICD-10-CM

## 2024-03-21 DIAGNOSIS — M54.41 CHRONIC BILATERAL LOW BACK PAIN WITH BILATERAL SCIATICA: Primary | ICD-10-CM

## 2024-03-21 NOTE — TELEPHONE ENCOUNTER
confirmed. Patient wants to do Dr. Hussain Chan in [place]. She contacted them and they do accept her insurance. But she will also get the names of others and let me know. She would like to see who can get her in the fastest.

## 2024-03-21 NOTE — TELEPHONE ENCOUNTER
Pt called to return missed call from PCP. Per PCP, I read MRI results to pt and gave pt the message from provider. Pt verbalized understanding and once she contacts her insurance, she will call to update regarding a neuro ref.    All questions answered at this time.

## 2024-03-25 ENCOUNTER — TELEPHONE (OUTPATIENT)
Dept: NEUROSURGERY | Facility: CLINIC | Age: 34
End: 2024-03-25
Payer: COMMERCIAL

## 2024-03-25 ENCOUNTER — OFFICE VISIT (OUTPATIENT)
Dept: BEHAVIORAL HEALTH | Facility: CLINIC | Age: 34
End: 2024-03-25
Payer: COMMERCIAL

## 2024-03-25 DIAGNOSIS — F41.1 GAD (GENERALIZED ANXIETY DISORDER): ICD-10-CM

## 2024-03-25 DIAGNOSIS — F33.1 MODERATE EPISODE OF RECURRENT MAJOR DEPRESSIVE DISORDER: Primary | ICD-10-CM

## 2024-03-25 DIAGNOSIS — F90.2 ATTENTION DEFICIT HYPERACTIVITY DISORDER (ADHD), COMBINED TYPE: ICD-10-CM

## 2024-03-25 PROCEDURE — 99214 OFFICE O/P EST MOD 30 MIN: CPT | Mod: 95,,, | Performed by: STUDENT IN AN ORGANIZED HEALTH CARE EDUCATION/TRAINING PROGRAM

## 2024-03-25 PROCEDURE — 1160F RVW MEDS BY RX/DR IN RCRD: CPT | Mod: CPTII,95,, | Performed by: STUDENT IN AN ORGANIZED HEALTH CARE EDUCATION/TRAINING PROGRAM

## 2024-03-25 PROCEDURE — 1159F MED LIST DOCD IN RCRD: CPT | Mod: CPTII,95,, | Performed by: STUDENT IN AN ORGANIZED HEALTH CARE EDUCATION/TRAINING PROGRAM

## 2024-03-25 RX ORDER — DEXTROAMPHETAMINE SACCHARATE, AMPHETAMINE ASPARTATE, DEXTROAMPHETAMINE SULFATE AND AMPHETAMINE SULFATE 7.5; 7.5; 7.5; 7.5 MG/1; MG/1; MG/1; MG/1
30 TABLET ORAL 2 TIMES DAILY
Qty: 60 TABLET | Refills: 0 | Status: SHIPPED | OUTPATIENT
Start: 2024-04-19 | End: 2024-04-26 | Stop reason: SDUPTHER

## 2024-03-25 RX ORDER — ARIPIPRAZOLE 5 MG/1
5 TABLET ORAL DAILY
Qty: 30 TABLET | Refills: 5 | Status: SHIPPED | OUTPATIENT
Start: 2024-03-25 | End: 2024-05-15

## 2024-03-25 NOTE — TELEPHONE ENCOUNTER
"Patient is being referred by RAFAEL Esquivel on 3/22/24 for chronic bilateral low back pain w/bilateral sciatica.     MRI on 3/21/24 "she has one spot that has mild central canal stenosis, this essentially means that the space inside the bones of the spine get too small.  This at times can cause pain if a nerve touches her spine."     Patient stated that low back pain shoots down both legs but more on the right and is now having numbness and tingling in feet has been going on for many years progressively getting worse. Patient stated pain wakes her at night. Rx Gabapentin 300mg TID and Diclofenac is not helping. Patient denies any previous spine surgeries.      Patient is scheduled w/JK on 4/15 at 11am.         "

## 2024-03-25 NOTE — PROGRESS NOTES
"Outpatient Psychiatry Follow-Up Visit    3/25/2024    Clinical Status of Patient:  Outpatient (Ambulatory)    Chief Complaint:  Nanci Clancy is a 33 y.o. female who presents today for follow-up of depression, anxiety, and attention problems. Patient last seen for follow-up on 2/28/2024. Met with patient.      TELE PSYCHIATRY Disclaimer   *The patient was informed despite using HIPPA compliant technology there may be risks including security breach, technological failure, inability to perform a comprehensive physical exam which could delay or prevent an accurate diagnosis, and potential complications from treatment decisions rendered over a telemedical platform.   The patient was also informed of the relationship between the physician and patient and the respective role of any other health care provider with respect to management of the patient; and notified that the pt may decline to receive medical services by telemedicine and may withdraw from such care at any time.     Patient's Current location: [address] [place] [address]     In Case of Emergency pts next of kin  Name:Keith Clancy  Phone number:    [phone]   Visit type: Virtual visit with synchronous audio and video  Total time spent with patient: 20 minutes    Interval History and Content of Current Session:  Interim Events/Subjective Report/Content of Current Session:   Pt reports doing "ok"  overall.  Reports unchanged mood, continues to report isolative behavior, "not bad" anxiety.  Sleep poor, 6-8 hrs nightly, frequent nighttime awakenings, non-restorative.  Appetite increased, weight increased.  Energy low, motivation poor.  Endorses irritability, denies hopelessness.  Denies SI/HI/AVH/paranoia, denies plan or desire for self harm or harm to others.  Denies SE from current regimen. Denies change in chronic somatic complaints. Pt voices desire to adjust regimen to address ongoing symptoms.      Psychiatric Review of " "Systems-is patient experiencing or having changes in  See HPI above.     Review of Systems   PSYCHIATRIC: Pertinant items are noted in the narrative.  CONSTITUTIONAL: No weight gain or loss.  MUSCULOSKELETAL: +back pain, denies myalgias  NEUROLOGIC: No weakness, sensory changes, seizures, confusion, memory loss, tremor or other abnormal movements.  +headaches.   CARDIAC: No CP, no palpitations  RESPIRATORY: No shortness of breath.  CARDIOVASCULAR: No tachycardia or chest pain.  GASTROINTESTINAL: No nausea, vomiting, pain, constipation or diarrhea.    Past Medical, Family and Social History: The patient's past medical, family and social history have been reviewed and updated as appropriate within the electronic medical record - see encounter notes.    Compliance: good    Side effects: denies    Risk Parameters:  Patient reports no suicidal ideation  Patient reports no homicidal ideation  Patient reports no self-injurious behavior  Patient reports no violent behavior    Exam (detailed: at least 9 elements; comprehensive: all 15 elements)   Constitutional  Vitals:  Most recent vital signs, dated less than 90 days prior to this appointment, were reviewed.     There were no vitals filed for this visit.       General:   Constitutional: No acute distress, appears stated age, casually dressed    Neurologic:   Motor: moves upper extremities spontaneously and without difficulty, no abnormal involuntary movements observed  Gait: [facility] 2/2 virtual visit    Mental status examination:   Appearance: appears stated age, casually dressed, no acute distress  Behavior: unremarkable for situation, calm and cooperative  Mood: "about the same"  Affect: mood congruent, constricted  Thought process: linear and goal directed  Associations: appropriate for conversation  Thought content: no plan or desire for self harm or harm to others, denies paranoia, no delusional ideation volunteered  Perceptions: denies hallucinations or other altered " perceptions  Orientation: oriented to day of week, month, year, location, and situation  Language: English, fluid  Attention: able to attend to interview  Insight: good  Judgement: good    PHQ9:  Over the last two weeks how often have you been bothered by little interest or pleasure in doing things: 2  Over the last two weeks how often have you been bothered by feeling down, depressed or hopeless: 3  PHQ-2 Total Score: 5  PHQ-9 Score: 18  PHQ-9 Interpretation: Moderately Severe        2/7/2024     3:04 PM 1/3/2024     9:35 AM 11/8/2023     3:19 PM   GAD7   1. Feeling nervous, anxious, or on edge? 3 3 3   2. Not being able to stop or control worrying? 3 2 3   3. Worrying too much about different things? 3 3 3   4. Trouble relaxing? 3 2 3   5. Being so restless that it is hard to sit still? 3 2 0   6. Becoming easily annoyed or irritable? 3 3 2   7. Feeling afraid as if something awful might happen? 0 0 0   8. If you checked off any problems, how difficult have these problems made it for you to do your work, take care of things at home, or get along with other people? 3 3 2   AAGTA-7 Score 18 15 14     Assessment and Diagnosis   Status/Progress: Based on the examination today, the patient's problem(s) is/are adequately but not ideally controlled.  New problems have not been presented today.   Co-morbidities and Lack of compliance are not complicating management of the primary condition.  Number of separate conditions addressed during today's visit: 3 (depression adequately but not ideally controlled, ADHD fair control, anxiety well controlled).  Are medication adjustments being made today: Yes.  Are referral(s) being ordered today: NO.  Complexity (level) of medical decision making employed in the encounter: MODERATE.    General Impression:    ICD-10-CM ICD-9-CM   1. Moderate episode of recurrent major depressive disorder  F33.1 296.32   2. AGATA (generalized anxiety disorder)  F41.1 300.02   3. Attention deficit  hyperactivity disorder (ADHD), combined type  F90.2 314.01     Intervention/Counseling/Treatment Plan   Continue adderall 30mg bid  Continue cymbalta 30mg daily  Increase abilify to 5mg daily for mood augmentation  No need for PEC as pt is not an imminent danger to self or others or gravely disabled due to acute psychiatric illness  Discussed that pt should either call clinic for psychiatric crisis symptoms or present to nearest emergency room    Discussed with patient informed consent including diagnosis, risks and benefits of proposed treatment above vs. alternative treatments vs. no treatment, as well as serious and common side effects of these treatments, and the inherent unpredictability of individual responses to these treatments. The patient expresses understanding of the above and displays the capacity to agree with this current plan. Patient also agrees that, currently, the benefits outweigh the risks and would like to pursue treatment at this time, and had no other questions.    Instructions:  Take all medications as prescribed.    Abstain from recreational drugs and alcohol.  Present to ED or call 911 for SI/HI plan or intent, psychosis, or medical emergency.    Return to Clinic: Follow up in about 6 weeks (around 5/6/2024).    Total time:   The total time for services performed on the date of the encounter (including review of prior visit notes, review of notes from other providers, review of results from laboratory/imaging studies, face-to-face time with patient, and time spent on other activities directly related to patient care): 24 minutes.    Miquel Be MD  [facility]

## 2024-04-10 ENCOUNTER — HOSPITAL ENCOUNTER (OUTPATIENT)
Dept: RADIOLOGY | Facility: HOSPITAL | Age: 34
Discharge: HOME | End: 2024-04-10
Attending: NEUROLOGICAL SURGERY
Payer: COMMERCIAL

## 2024-04-10 DIAGNOSIS — K62.89 CHRONIC IDIOPATHIC ANAL PAIN: ICD-10-CM

## 2024-04-10 DIAGNOSIS — M54.40 LUMBAGO WITH SCIATICA: ICD-10-CM

## 2024-04-10 DIAGNOSIS — M54.14 THORACIC NEURITIS: ICD-10-CM

## 2024-04-10 DIAGNOSIS — M54.12 BRACHIAL NEURITIS: ICD-10-CM

## 2024-04-10 DIAGNOSIS — G89.29 CHRONIC IDIOPATHIC ANAL PAIN: ICD-10-CM

## 2024-04-10 DIAGNOSIS — M54.14 THORACIC NEURITIS: Primary | ICD-10-CM

## 2024-04-10 PROCEDURE — 72052 X-RAY EXAM NECK SPINE 6/>VWS: CPT | Mod: TC

## 2024-04-10 PROCEDURE — 72114 X-RAY EXAM L-S SPINE BENDING: CPT | Mod: TC

## 2024-04-10 PROCEDURE — 72082 X-RAY EXAM ENTIRE SPI 2/3 VW: CPT | Mod: TC

## 2024-04-10 PROCEDURE — 72074 X-RAY EXAM THORAC SPINE4/>VW: CPT | Mod: TC,59

## 2024-04-11 ENCOUNTER — TELEPHONE (OUTPATIENT)
Dept: FAMILY MEDICINE | Facility: CLINIC | Age: 34
End: 2024-04-11
Payer: COMMERCIAL

## 2024-04-12 NOTE — TELEPHONE ENCOUNTER
Called and spoke with patient. Verified . Patient states is trying to receive accommodations with her current employer regarding purchasing a chair for her when she is working. [facility] will email paperwork for Tia to review and fill out.

## 2024-04-26 ENCOUNTER — OFFICE VISIT (OUTPATIENT)
Dept: FAMILY MEDICINE | Facility: CLINIC | Age: 34
End: 2024-04-26
Payer: COMMERCIAL

## 2024-04-26 DIAGNOSIS — M54.2 CHRONIC NECK PAIN: Primary | ICD-10-CM

## 2024-04-26 DIAGNOSIS — G89.29 CHRONIC NECK PAIN: Primary | ICD-10-CM

## 2024-04-26 DIAGNOSIS — M54.50 CHRONIC BILATERAL LOW BACK PAIN, UNSPECIFIED WHETHER SCIATICA PRESENT: Chronic | ICD-10-CM

## 2024-04-26 DIAGNOSIS — F33.1 MODERATE EPISODE OF RECURRENT MAJOR DEPRESSIVE DISORDER: ICD-10-CM

## 2024-04-26 DIAGNOSIS — F90.2 ATTENTION DEFICIT HYPERACTIVITY DISORDER (ADHD), COMBINED TYPE: Primary | ICD-10-CM

## 2024-04-26 DIAGNOSIS — G89.29 CHRONIC BILATERAL LOW BACK PAIN, UNSPECIFIED WHETHER SCIATICA PRESENT: Chronic | ICD-10-CM

## 2024-04-26 PROCEDURE — 99213 OFFICE O/P EST LOW 20 MIN: CPT | Mod: 95,,,

## 2024-04-26 PROCEDURE — 1159F MED LIST DOCD IN RCRD: CPT | Mod: CPTII,95,,

## 2024-04-26 PROCEDURE — 1160F RVW MEDS BY RX/DR IN RCRD: CPT | Mod: CPTII,95,,

## 2024-04-26 RX ORDER — DEXTROAMPHETAMINE SACCHARATE, AMPHETAMINE ASPARTATE, DEXTROAMPHETAMINE SULFATE AND AMPHETAMINE SULFATE 7.5; 7.5; 7.5; 7.5 MG/1; MG/1; MG/1; MG/1
30 TABLET ORAL 2 TIMES DAILY
Qty: 60 TABLET | Refills: 0 | Status: SHIPPED | OUTPATIENT
Start: 2024-04-26 | End: 2024-06-03 | Stop reason: SDUPTHER

## 2024-04-26 NOTE — PROGRESS NOTES
Audio/visual Telehealth Visit     The patient location is:  [place]  The chief complaint leading to consultation is: back and neck pain  Visit type:  Synchronous audio visual  Total time spent with patient: 20 min     Each patient to whom I provide medical services by telemedicine is:  (1) informed of the relationship between the physician and patient and the respective role of any other health care provider with respect to management of the patient; and (2) notified that they may decline to receive medical services by telemedicine and may withdraw from such care at any time. Patient verbally consented to receive this service via audio/visual call.      Patient Name: Nanci Clancy   : 1990  MRN: [id number]     Subjective:   Patient ID: Nanci Clancy is a 33 y.o. female.    Chief Complaint: No chief complaint on file.       HPI: 2024:  Virtual visit with patient today to discuss work accommodations.  Patient does work from home.  States that she frequently needs to stand up from desk to avoid sitting for extended periods of time.  Patient is currently working with neurosurgeon as well who will be ordering MRI of her neck.  Patient follows with physical therapy as well as chiropractic care.  Patient states that she often will need 15 minute break in order to not be in sitting position all day. Patient denies chest pain, palpitations, and shortness of breath.  Patient denies fever, night sweats, chills, nausea, vomiting, diarrhea, constipation, weight loss, and changes in appetite.    11/15/2023: Patient presents for evaluation of low back problems. Symptoms have been present for a few years and include pain in bilateral lower back (aching, burning, and shooting in character; 8/10 in severity) and paresthesias in bilateral legs, left being worse than right. Initial inciting event: none. Alleviating factors identifiable by the patient are nothing. Aggravating factors identifiable by the  "patient are bending forwards, running, sitting, standing, and walking. Treatments initiated by the patient: she has participated in 6 weeks of conservative   Management with [facility], she also was discharged to a home therapy program, patient is a  and has been attempting to continue physical therapy and stretches for about 3 months   With no improvement. Previous lower back problems: none. Previous treatments:  physical therapy, NSAIDs, rest   With no drastic long-term improvement.    04/13/2023:  Will visit with patient today to discuss anxiety, increasing inattention as well as follow-up previous gastroenterologist complaints.  Patient states that she has recently started Linzess and has noticed a drastic improvement in her constipation.  Patient additionally states that she has been using the Vistaril again for as-needed anxiety in addition to the BusPar and states that her anxiety is "okay".  States that she has a lot of external stressors with her kids being home for spring break currently as well as she is currently enrolled in school to become a .  Patient does not know if it is her anxiety or if she is ADHD but states that she has difficult time focusing in his often having to reread the same topics in his forgetful to things that were just told to her.  Notes that  points out that she can not remember simple conversations that they have.  Patient would like to wait until she establishes care with the psychiatrist on May 30th because she does not like taking lots of medication.  She is been trying to manage stress and anxiety with working out as well as diet.    02/08/2023:  Patient made this appointment today to discuss increased fatigue and inattention since utilizing Vistaril for anxiety.  Patient states that she noticed that she is having to use this medication more throughout the day and endorses increase in her anxiety.  Patient " is amenable to starting daily medication and attempt to prevent anxiety from occurring.  At length discussion with patient about medication regimen    01/19/2023:  Virtual visit with patient today to discuss abdominal ultrasound was performed, patient has also establish care with gastroenterologist.  Patient states that she will be having a colonoscopy and endoscopy performed.  Patient is still having blood in her stool.  Patient states that she has noticed improvement in her anxiety since utilizing Vistaril more often.  She is satisfied with this medication currently would not like to make any changes or use a preventative medicine at this time.    10/21/2022:  Virtual visit today with patient to review labs, patient complaining additionally of abdominal bloating, occasional blood in stool. Patient states she has been eating healthy and only eats whole foods. She has had hemorrhoids in the past but does not notice any currently. Has annual appointment with gyn next week. Her Anxiety is still present but she has notice improvement in mood and anxiety since starting vistaril. Notes improved sleep.     09/15/2022:  Patient here to establish care today, she denies seeing a general practitioner PCP and her daughter alive, she is established with gynecologist who performs her women's wellness for her.  She has follow-up with him beginning of October.  Patient today complains of chronic back pain after accident about 10 years ago, she has tried managing this herself with daily workout routine.  She is requesting further workup to help with her pain in get proper treatment.  Patient denies any bladder or bowel incontinence, saddle anesthesia or tingling of lower extremities.  Patient also complains of increased anxiety and outburst of anger.  Patient states that she is interested in starting psychotherapy to help with these issues but also may need medication to assist.  Patient denies SI/HI.        ROS:    12 point review  of systems conducted, negative except as stated in the history of present illness. See HPI for details.  Answers submitted by the patient for this visit:  Review of Systems Questionnaire (Submitted on 2024)  activity change: No  unexpected weight change: No  neck pain: Yes  hearing loss: No  rhinorrhea: No  trouble swallowing: No  eye discharge: No  visual disturbance: No  chest tightness: No  wheezing: No  chest pain: No  palpitations: No  blood in stool: No  constipation: No  vomiting: No  diarrhea: No  polydipsia: No  polyuria: No  difficulty urinating: No  hematuria: No  menstrual problem: No  dysuria: No  joint swelling: No  arthralgias: Yes  headaches: Yes  weakness: No  confusion: No  dysphoric mood: No      History:   No past medical history on file.   Past Surgical History:   Procedure Laterality Date     SECTION      HYSTERECTOMY      PARTIAL HYSTERECTOMY       No family history on file.   Social History     Tobacco Use    Smoking status: Never    Smokeless tobacco: Never   Substance and Sexual Activity    Alcohol use: Yes     Comment: Rarely    Drug use: Never    Sexual activity: Yes     Partners: Male        Allergies: Review of patient's allergies indicates:  No Known Allergies  Objective:   There were no vitals filed for this visit.  There is no height or weight on file to calculate BMI.     Physical Examination:   Physical Exam  Constitutional:       General: She is not in acute distress.     Comments: Limited Physical Exam due to telemedicine visit   Pulmonary:      Effort: Pulmonary effort is normal. No respiratory distress.   Neurological:      Mental Status: She is alert.   Psychiatric:         Mood and Affect: Mood normal.         Behavior: Behavior normal.         Assessment:     Problem List Items Addressed This Visit       Chronic low back pain (Chronic)    Overview     Lower back stretches daily.  Avoid strenuous lifting, use proper body mechanics.  Heating pad, Ice pack,  Biofreeze or Epsom salt baths as needed.  Exercise to strengthen core muscles to support your back.  PT Referral given.             Current Assessment & Plan     Work accomodation filled out.         Chronic neck pain - Primary    Current Assessment & Plan     Keep appointment for MRI and for followup with Neurosurgeon             Plan:   Diagnoses and all orders for this visit:    Chronic neck pain    Chronic bilateral low back pain, unspecified whether sciatica present       Follow up in about 19 days (around 5/15/2024), or if symptoms worsen or fail to improve.       This note was created with the assistance of a voice recognition software or phone dictation. There may be transcription errors as a result of using this technology however minimal. Effort has been made to assure accuracy of transcription but any obvious errors or omissions should be clarified with the author of the document      This service was not originating from a related E/M service provided within the previous 7 days nor will  to an E/M service or procedure within the next 24 hours or my soonest available appointment.  Prevailing standard of care was able to be met in this audio-only visit.     I spent a total of 20 minutes on the day of the visit.This includes face to face time and non-face to face time preparing to see the patient (eg, review of tests), obtaining and/or reviewing separately obtained history, documenting clinical information in the electronic or other health record, independently interpreting results and communicating results to the patient/family/caregiver, or care coordinator.

## 2024-05-15 ENCOUNTER — OFFICE VISIT (OUTPATIENT)
Dept: FAMILY MEDICINE | Facility: CLINIC | Age: 34
End: 2024-05-15
Payer: COMMERCIAL

## 2024-05-15 ENCOUNTER — OFFICE VISIT (OUTPATIENT)
Dept: BEHAVIORAL HEALTH | Facility: CLINIC | Age: 34
End: 2024-05-15
Payer: COMMERCIAL

## 2024-05-15 VITALS
DIASTOLIC BLOOD PRESSURE: 75 MMHG | HEIGHT: 62 IN | SYSTOLIC BLOOD PRESSURE: 111 MMHG | BODY MASS INDEX: 23.67 KG/M2 | WEIGHT: 128.63 LBS | TEMPERATURE: 98 F | RESPIRATION RATE: 18 BRPM | OXYGEN SATURATION: 97 % | HEART RATE: 77 BPM

## 2024-05-15 DIAGNOSIS — F33.1 MODERATE EPISODE OF RECURRENT MAJOR DEPRESSIVE DISORDER: Primary | ICD-10-CM

## 2024-05-15 DIAGNOSIS — F41.1 GAD (GENERALIZED ANXIETY DISORDER): ICD-10-CM

## 2024-05-15 DIAGNOSIS — F90.2 ATTENTION DEFICIT HYPERACTIVITY DISORDER (ADHD), COMBINED TYPE: ICD-10-CM

## 2024-05-15 DIAGNOSIS — L25.9 CONTACT DERMATITIS, UNSPECIFIED CONTACT DERMATITIS TYPE, UNSPECIFIED TRIGGER: ICD-10-CM

## 2024-05-15 DIAGNOSIS — Z00.00 ENCOUNTER FOR WELLNESS EXAMINATION: Primary | ICD-10-CM

## 2024-05-15 LAB
25(OH)D3+25(OH)D2 SERPL-MCNC: 20 NG/ML (ref 30–80)
ALBUMIN SERPL-MCNC: 3.9 G/DL (ref 3.5–5)
ALBUMIN/GLOB SERPL: 1.3 RATIO (ref 1.1–2)
ALP SERPL-CCNC: 67 UNIT/L (ref 40–150)
ALT SERPL-CCNC: 22 UNIT/L (ref 0–55)
AST SERPL-CCNC: 17 UNIT/L (ref 5–34)
BACTERIA #/AREA URNS AUTO: ABNORMAL /HPF
BASOPHILS # BLD AUTO: 0.05 X10(3)/MCL
BASOPHILS NFR BLD AUTO: 0.7 %
BILIRUB SERPL-MCNC: 0.5 MG/DL
BILIRUB UR QL STRIP.AUTO: NEGATIVE
BUN SERPL-MCNC: 22.3 MG/DL (ref 7–18.7)
CALCIUM SERPL-MCNC: 9.8 MG/DL (ref 8.4–10.2)
CHLORIDE SERPL-SCNC: 105 MMOL/L (ref 98–107)
CHOLEST SERPL-MCNC: 174 MG/DL
CHOLEST/HDLC SERPL: 4 {RATIO} (ref 0–5)
CLARITY UR: CLEAR
CO2 SERPL-SCNC: 26 MMOL/L (ref 22–29)
COLOR UR AUTO: COLORLESS
CREAT SERPL-MCNC: 0.83 MG/DL (ref 0.55–1.02)
EOSINOPHIL # BLD AUTO: 0.09 X10(3)/MCL (ref 0–0.9)
EOSINOPHIL NFR BLD AUTO: 1.3 %
ERYTHROCYTE [DISTWIDTH] IN BLOOD BY AUTOMATED COUNT: 11.8 % (ref 11.5–17)
EST. AVERAGE GLUCOSE BLD GHB EST-MCNC: 88.2 MG/DL
ESTRADIOL SERPL HS-MCNC: 108 PG/ML
FERRITIN SERPL-MCNC: 70.51 NG/ML (ref 4.63–204)
FSH SERPL-ACNC: 3.16 MIU/ML
GFR SERPLBLD CREATININE-BSD FMLA CKD-EPI: >60 ML/MIN/1.73/M2
GLOBULIN SER-MCNC: 3.1 GM/DL (ref 2.4–3.5)
GLUCOSE SERPL-MCNC: 70 MG/DL (ref 74–100)
GLUCOSE UR QL STRIP: NORMAL
HBA1C MFR BLD: 4.7 %
HCT VFR BLD AUTO: 45.4 % (ref 37–47)
HDLC SERPL-MCNC: 49 MG/DL (ref 35–60)
HGB BLD-MCNC: 14.9 G/DL (ref 12–16)
HGB UR QL STRIP: NEGATIVE
HYALINE CASTS #/AREA URNS LPF: ABNORMAL /LPF
IMM GRANULOCYTES # BLD AUTO: 0.02 X10(3)/MCL (ref 0–0.04)
IMM GRANULOCYTES NFR BLD AUTO: 0.3 %
KETONES UR QL STRIP: NEGATIVE
LDLC SERPL CALC-MCNC: 109 MG/DL (ref 50–140)
LEUKOCYTE ESTERASE UR QL STRIP: 25
LH SERPL-ACNC: 3.72 MIU/ML
LYMPHOCYTES # BLD AUTO: 1.75 X10(3)/MCL (ref 0.6–4.6)
LYMPHOCYTES NFR BLD AUTO: 24.8 %
MCH RBC QN AUTO: 30.2 PG (ref 27–31)
MCHC RBC AUTO-ENTMCNC: 32.8 G/DL (ref 33–36)
MCV RBC AUTO: 91.9 FL (ref 80–94)
MONOCYTES # BLD AUTO: 0.55 X10(3)/MCL (ref 0.1–1.3)
MONOCYTES NFR BLD AUTO: 7.8 %
NEUTROPHILS # BLD AUTO: 4.6 X10(3)/MCL (ref 2.1–9.2)
NEUTROPHILS NFR BLD AUTO: 65.1 %
NITRITE UR QL STRIP: NEGATIVE
NRBC BLD AUTO-RTO: 0 %
PH UR STRIP: 6.5 [PH]
PLATELET # BLD AUTO: 296 X10(3)/MCL (ref 130–400)
PMV BLD AUTO: 10.2 FL (ref 7.4–10.4)
POTASSIUM SERPL-SCNC: 5.1 MMOL/L (ref 3.5–5.1)
PROGEST SERPL-MCNC: 11.1 NG/ML
PROT SERPL-MCNC: 7 GM/DL (ref 6.4–8.3)
PROT UR QL STRIP: NEGATIVE
RBC # BLD AUTO: 4.94 X10(6)/MCL (ref 4.2–5.4)
RBC #/AREA URNS AUTO: ABNORMAL /HPF
SODIUM SERPL-SCNC: 138 MMOL/L (ref 136–145)
SP GR UR STRIP.AUTO: 1.01 (ref 1–1.03)
SQUAMOUS #/AREA URNS LPF: ABNORMAL /HPF
T4 FREE SERPL-MCNC: 0.9 NG/DL (ref 0.7–1.48)
TRIGL SERPL-MCNC: 79 MG/DL (ref 37–140)
TSH SERPL-ACNC: 2.52 UIU/ML (ref 0.35–4.94)
UROBILINOGEN UR STRIP-ACNC: NORMAL
VLDLC SERPL CALC-MCNC: 16 MG/DL
WBC # SPEC AUTO: 7.06 X10(3)/MCL (ref 4.5–11.5)
WBC #/AREA URNS AUTO: ABNORMAL /HPF

## 2024-05-15 PROCEDURE — 82670 ASSAY OF TOTAL ESTRADIOL: CPT

## 2024-05-15 PROCEDURE — 3044F HG A1C LEVEL LT 7.0%: CPT | Mod: CPTII,95,, | Performed by: STUDENT IN AN ORGANIZED HEALTH CARE EDUCATION/TRAINING PROGRAM

## 2024-05-15 PROCEDURE — 82306 VITAMIN D 25 HYDROXY: CPT

## 2024-05-15 PROCEDURE — 80061 LIPID PANEL: CPT

## 2024-05-15 PROCEDURE — 1160F RVW MEDS BY RX/DR IN RCRD: CPT | Mod: CPTII,95,, | Performed by: STUDENT IN AN ORGANIZED HEALTH CARE EDUCATION/TRAINING PROGRAM

## 2024-05-15 PROCEDURE — 80053 COMPREHEN METABOLIC PANEL: CPT

## 2024-05-15 PROCEDURE — 83001 ASSAY OF GONADOTROPIN (FSH): CPT

## 2024-05-15 PROCEDURE — 3074F SYST BP LT 130 MM HG: CPT | Mod: CPTII,,,

## 2024-05-15 PROCEDURE — 85025 COMPLETE CBC W/AUTO DIFF WBC: CPT

## 2024-05-15 PROCEDURE — 3008F BODY MASS INDEX DOCD: CPT | Mod: CPTII,,,

## 2024-05-15 PROCEDURE — 1159F MED LIST DOCD IN RCRD: CPT | Mod: CPTII,95,, | Performed by: STUDENT IN AN ORGANIZED HEALTH CARE EDUCATION/TRAINING PROGRAM

## 2024-05-15 PROCEDURE — 83036 HEMOGLOBIN GLYCOSYLATED A1C: CPT

## 2024-05-15 PROCEDURE — 83002 ASSAY OF GONADOTROPIN (LH): CPT

## 2024-05-15 PROCEDURE — 99214 OFFICE O/P EST MOD 30 MIN: CPT | Mod: 95,,, | Performed by: STUDENT IN AN ORGANIZED HEALTH CARE EDUCATION/TRAINING PROGRAM

## 2024-05-15 PROCEDURE — 82728 ASSAY OF FERRITIN: CPT

## 2024-05-15 PROCEDURE — 81001 URINALYSIS AUTO W/SCOPE: CPT

## 2024-05-15 PROCEDURE — 36415 COLL VENOUS BLD VENIPUNCTURE: CPT

## 2024-05-15 PROCEDURE — 84439 ASSAY OF FREE THYROXINE: CPT

## 2024-05-15 PROCEDURE — 3078F DIAST BP <80 MM HG: CPT | Mod: CPTII,,,

## 2024-05-15 PROCEDURE — 3044F HG A1C LEVEL LT 7.0%: CPT | Mod: CPTII,,,

## 2024-05-15 PROCEDURE — 99395 PREV VISIT EST AGE 18-39: CPT | Mod: S$PBB,,,

## 2024-05-15 PROCEDURE — 1160F RVW MEDS BY RX/DR IN RCRD: CPT | Mod: CPTII,,,

## 2024-05-15 PROCEDURE — 84443 ASSAY THYROID STIM HORMONE: CPT

## 2024-05-15 PROCEDURE — 1159F MED LIST DOCD IN RCRD: CPT | Mod: CPTII,,,

## 2024-05-15 PROCEDURE — 84144 ASSAY OF PROGESTERONE: CPT

## 2024-05-15 PROCEDURE — 99214 OFFICE O/P EST MOD 30 MIN: CPT | Mod: PBBFAC,PN

## 2024-05-15 RX ORDER — FLUTICASONE PROPIONATE 50 MCG
1 SPRAY, SUSPENSION (ML) NASAL
COMMUNITY
Start: 2024-05-08 | End: 2024-11-04

## 2024-05-15 RX ORDER — DULOXETIN HYDROCHLORIDE 30 MG/1
30 CAPSULE, DELAYED RELEASE ORAL DAILY
Qty: 30 CAPSULE | Refills: 5 | Status: SHIPPED | OUTPATIENT
Start: 2024-05-15 | End: 2025-05-15

## 2024-05-15 RX ORDER — TRIAMCINOLONE ACETONIDE 1 MG/G
CREAM TOPICAL 2 TIMES DAILY
Qty: 80 G | Refills: 5 | Status: SHIPPED | OUTPATIENT
Start: 2024-05-15 | End: 2025-05-15

## 2024-05-15 NOTE — PROGRESS NOTES
"Outpatient Psychiatry Follow-Up Visit    5/15/2024    Clinical Status of Patient:  Outpatient (Ambulatory)    Chief Complaint:  Nanci Clancy is a 33 y.o. female who presents today for follow-up of depression, anxiety, and attention problems. Patient last seen for follow-up on 3/25/2024. Met with patient.      TELE PSYCHIATRY Disclaimer   *The patient was informed despite using HIPPA compliant technology there may be risks including security breach, technological failure, inability to perform a comprehensive physical exam which could delay or prevent an accurate diagnosis, and potential complications from treatment decisions rendered over a telemedical platform.   The patient was also informed of the relationship between the physician and patient and the respective role of any other health care provider with respect to management of the patient; and notified that the pt may decline to receive medical services by telemedicine and may withdraw from such care at any time.     Patient's Current location: [address] [place] [address]     In Case of Emergency pts next of kin  Name:Keith Clancy  Phone number:    [phone]   Visit type: Virtual visit with synchronous audio and video  Total time spent with patient: 20 minutes    Interval History and Content of Current Session:  Interim Events/Subjective Report/Content of Current Session:   Pt reports doing "ok"  overall.  Feeling largely the same relative to last visit.  Notes "I'm still getting bouts of anger."  Reports continued depressed mood, "not bad" anxiety.  Reports crying spells.  Sleep 6 hrs nightly, tired on awakening, multiple nightly awakenings. Appetite elevated, weight increased.  Energy low, motivation low.  Endorses irritability, endorses hopelessness.  Denies SI/HI/AVH/paranoia, denies plan or desire for self harm or harm to others.  Reports SE from current regimen: increased appetite from abilify. Reports somatic complaints of " "weight gain. Pt voices desire to adjust regimen to address ongoing symptoms.      Psychiatric Review of Systems-is patient experiencing or having changes in  See HPI above.     Review of Systems   PSYCHIATRIC: Pertinant items are noted in the narrative.  CONSTITUTIONAL: No weight gain or loss.  MUSCULOSKELETAL: +back pain, denies myalgias  NEUROLOGIC: No weakness, sensory changes, seizures, confusion, memory loss, tremor or other abnormal movements.  +headaches.   CARDIAC: No CP, no palpitations  RESPIRATORY: No shortness of breath.  CARDIOVASCULAR: No tachycardia or chest pain.  GASTROINTESTINAL: No nausea, vomiting, pain, constipation or diarrhea.    Past Medical, Family and Social History: The patient's past medical, family and social history have been reviewed and updated as appropriate within the electronic medical record - see encounter notes.    Compliance: good    Side effects: denies    Risk Parameters:  Patient reports no suicidal ideation  Patient reports no homicidal ideation  Patient reports no self-injurious behavior  Patient reports no violent behavior    Exam (detailed: at least 9 elements; comprehensive: all 15 elements)   Constitutional  Vitals:  Most recent vital signs, dated less than 90 days prior to this appointment, were reviewed.     There were no vitals filed for this visit.       General:   Constitutional: No acute distress, appears stated age, casually dressed    Neurologic:   Motor: moves upper extremities spontaneously and without difficulty, no abnormal involuntary movements observed  Gait: [facility] 2/2 virtual visit    Mental status examination:   Appearance: appears stated age, casually dressed, no acute distress  Behavior: unremarkable for situation, calm and cooperative  Mood: "about the same"  Affect: mood congruent, constricted  Thought process: linear and goal directed  Associations: appropriate for conversation  Thought content: no plan or desire for self harm or harm to others, " denies paranoia, no delusional ideation volunteered  Perceptions: denies hallucinations or other altered perceptions  Orientation: oriented to day of week, month, year, location, and situation  Language: English, fluid  Attention: able to attend to interview  Insight: good  Judgement: good    PHQ9:  Over the last two weeks how often have you been bothered by little interest or pleasure in doing things: 2  Over the last two weeks how often have you been bothered by feeling down, depressed or hopeless: 3  PHQ-2 Total Score: 5  PHQ-9 Score: 18  PHQ-9 Interpretation: Moderately Severe        2/7/2024     3:04 PM 1/3/2024     9:35 AM 11/8/2023     3:19 PM   GAD7   1. Feeling nervous, anxious, or on edge? 3 3 3   2. Not being able to stop or control worrying? 3 2 3   3. Worrying too much about different things? 3 3 3   4. Trouble relaxing? 3 2 3   5. Being so restless that it is hard to sit still? 3 2 0   6. Becoming easily annoyed or irritable? 3 3 2   7. Feeling afraid as if something awful might happen? 0 0 0   8. If you checked off any problems, how difficult have these problems made it for you to do your work, take care of things at home, or get along with other people? 3 3 2   AGATA-7 Score 18 15 14     Assessment and Diagnosis   Status/Progress: Based on the examination today, the patient's problem(s) is/are adequately but not ideally controlled.  New problems have not been presented today.   Co-morbidities and Lack of compliance are not complicating management of the primary condition.  Number of separate conditions addressed during today's visit: 3 (mood inadequately controlled, anxiety well controlled, ADHD fair control).  Are medication adjustments being made today: Yes.  Are referral(s) being ordered today: No.  Complexity (level) of medical decision making employed in the encounter: MODERATE.    General Impression:    ICD-10-CM ICD-9-CM   1. Moderate episode of recurrent major depressive disorder  F33.1 296.32    2. AGAAT (generalized anxiety disorder)  F41.1 300.02   3. Attention deficit hyperactivity disorder (ADHD), combined type  F90.2 314.01     Intervention/Counseling/Treatment Plan   Continue adderall 30mg bid  Continue cymbalta 30mg daily  Stop abilify 2/2 SE  Start auvelity 45-105mg daily x7 days, then 45-105mg bid thereafter, discussed potential Se including but not limited to anxiety, jitteriness, insomnia, altered perceptions, depression, suicidal thinking/behavior  No need for PEC as pt is not an imminent danger to self or others or gravely disabled due to acute psychiatric illness  Discussed that pt should either call clinic for psychiatric crisis symptoms or present to nearest emergency room    Discussed with patient informed consent including diagnosis, risks and benefits of proposed treatment above vs. alternative treatments vs. no treatment, as well as serious and common side effects of these treatments, and the inherent unpredictability of individual responses to these treatments. The patient expresses understanding of the above and displays the capacity to agree with this current plan. Patient also agrees that, currently, the benefits outweigh the risks and would like to pursue treatment at this time, and had no other questions.    Instructions:  Take all medications as prescribed.    Abstain from recreational drugs and alcohol.  Present to ED or call 911 for SI/HI plan or intent, psychosis, or medical emergency.    Return to Clinic: Follow up in about 6 weeks (around 6/26/2024).    Total time:   The total time for services performed on the date of the encounter (including review of prior visit notes, review of notes from other providers, review of results from laboratory/imaging studies, face-to-face time with patient, and time spent on other activities directly related to patient care): 20 minutes.    Miquel Be MD  [facility]

## 2024-05-15 NOTE — PROGRESS NOTES
"Patient Name: Nanci Clancy     : 1990    MRN: [id number]     Subjective:     Patient ID: Nanci Clancy is a 33 y.o. female.    Chief Complaint:   Chief Complaint   Patient presents with    Follow-up     States has been "having weird skin stuff" doesn't know if she needs to see a dermatologist. Chronic neck and back pain. States has a chiropractor appointment today.         HPI: 05/15/2024: here today for annual wellness exam, is requesting referral for derm to skin eval clinic. States gets random rashes that clear on their own, feared condition not present today. Patient denies chest pain, palpitations, and shortness of breath.  Patient denies fever, night sweats, chills, nausea, vomiting, diarrhea, constipation, weight loss, and changes in appetite.    2024:  Virtual visit with patient today to discuss work accommodations.  Patient does work from home.  States that she frequently needs to stand up from desk to avoid sitting for extended periods of time.  Patient is currently working with neurosurgeon as well who will be ordering MRI of her neck.  Patient follows with physical therapy as well as chiropractic care.  Patient states that she often will need 15 minute break in order to not be in sitting position all day. Patient denies chest pain, palpitations, and shortness of breath.  Patient denies fever, night sweats, chills, nausea, vomiting, diarrhea, constipation, weight loss, and changes in appetite.    11/15/2023: Patient presents for evaluation of low back problems. Symptoms have been present for a few years and include pain in bilateral lower back (aching, burning, and shooting in character; 8/10 in severity) and paresthesias in bilateral legs, left being worse than right. Initial inciting event: none. Alleviating factors identifiable by the patient are nothing. Aggravating factors identifiable by the patient are bending forwards, running, sitting, standing, and walking. " "Treatments initiated by the patient: she has participated in 6 weeks of conservative   Management with physical therapy at [facility], she also was discharged to a home therapy program, patient is a  and has been attempting to continue physical therapy and stretches for about 3 months   With no improvement. Previous lower back problems: none. Previous treatments:  physical therapy, NSAIDs, rest   With no drastic long-term improvement.    04/13/2023:  Will visit with patient today to discuss anxiety, increasing inattention as well as follow-up previous gastroenterologist complaints.  Patient states that she has recently started Linzess and has noticed a drastic improvement in her constipation.  Patient additionally states that she has been using the Vistaril again for as-needed anxiety in addition to the BusPar and states that her anxiety is "okay".  States that she has a lot of external stressors with her kids being home for spring break currently as well as she is currently enrolled in school to become a .  Patient does not know if it is her anxiety or if she is ADHD but states that she has difficult time focusing in his often having to reread the same topics in his forgetful to things that were just told to her.  Notes that  points out that she can not remember simple conversations that they have.  Patient would like to wait until she establishes care with the psychiatrist on May 30th because she does not like taking lots of medication.  She is been trying to manage stress and anxiety with working out as well as diet.    02/08/2023:  Patient made this appointment today to discuss increased fatigue and inattention since utilizing Vistaril for anxiety.  Patient states that she noticed that she is having to use this medication more throughout the day and endorses increase in her anxiety.  Patient is amenable to starting daily medication and attempt to prevent " anxiety from occurring.  At length discussion with patient about medication regimen    01/19/2023:  Virtual visit with patient today to discuss abdominal ultrasound was performed, patient has also establish care with gastroenterologist.  Patient states that she will be having a colonoscopy and endoscopy performed.  Patient is still having blood in her stool.  Patient states that she has noticed improvement in her anxiety since utilizing Vistaril more often.  She is satisfied with this medication currently would not like to make any changes or use a preventative medicine at this time.    10/21/2022:  Virtual visit today with patient to review labs, patient complaining additionally of abdominal bloating, occasional blood in stool. Patient states she has been eating healthy and only eats whole foods. She has had hemorrhoids in the past but does not notice any currently. Has annual appointment with gyn next week. Her Anxiety is still present but she has notice improvement in mood and anxiety since starting vistaril. Notes improved sleep.     09/15/2022:  Patient here to establish care today, she denies seeing a general practitioner PCP and her daughter alive, she is established with gynecologist who performs her women's wellness for her.  She has follow-up with him beginning of October.  Patient today complains of chronic back pain after accident about 10 years ago, she has tried managing this herself with daily workout routine.  She is requesting further workup to help with her pain in get proper treatment.  Patient denies any bladder or bowel incontinence, saddle anesthesia or tingling of lower extremities.  Patient also complains of increased anxiety and outburst of anger.  Patient states that she is interested in starting psychotherapy to help with these issues but also may need medication to assist.  Patient denies SI/HI.        ROS:       12 point review of systems conducted, negative except as stated in the  "history of present illness. See HPI for details.    History:     History reviewed. No pertinent past medical history.     Past Surgical History:   Procedure Laterality Date     SECTION      HYSTERECTOMY      PARTIAL HYSTERECTOMY         No family history on file.     Social History     Tobacco Use    Smoking status: Never    Smokeless tobacco: Never   Substance and Sexual Activity    Alcohol use: Yes     Comment: Rarely    Drug use: Never    Sexual activity: Yes     Partners: Male       Current Outpatient Medications   Medication Instructions    ARIPiprazole (ABILIFY) 5 mg, Oral, Daily    dextroamphetamine-amphetamine (ADDERALL) 30 mg Tab 30 mg, Oral, 2 times daily    DULoxetine (CYMBALTA) 30 mg, Oral, Daily    fluticasone propionate (FLONASE) 50 mcg/actuation nasal spray 1 spray, Nasal    gabapentin (NEURONTIN) 300 mg, Oral, 3 times daily PRN    pantoprazole (PROTONIX) 40 mg, Every morning    PEPCID 40 mg, Nightly    triamcinolone acetonide 0.1% (KENALOG) 0.1 % cream Topical (Top), 2 times daily        Review of patient's allergies indicates:  No Known Allergies    Objective:     Visit Vitals  /75 (BP Location: Right arm, Patient Position: Sitting)   Pulse 77   Temp 98.3 °F (36.8 °C) (Oral)   Resp 18   Ht 5' 2" (1.575 m)   Wt 58.3 kg (128 lb 9.6 oz)   SpO2 97%   BMI 23.52 kg/m²       Physical Examination:     Physical Exam  Constitutional:       General: She is not in acute distress.     Appearance: Normal appearance. She is not ill-appearing.   Cardiovascular:      Rate and Rhythm: Normal rate and regular rhythm.      Heart sounds: Normal heart sounds.   Pulmonary:      Effort: Pulmonary effort is normal. No respiratory distress.      Breath sounds: Normal breath sounds.   Musculoskeletal:      Cervical back: Normal range of motion.   Skin:     General: Skin is warm and dry.   Neurological:      Mental Status: She is alert and oriented to person, place, and time.   Psychiatric:         Mood and " Affect: Mood normal.         Behavior: Behavior normal.         Lab Results:     Chemistry:  Lab Results   Component Value Date     05/15/2024    K 5.1 05/15/2024    CHLORIDE 105 05/15/2024    BUN 22.3 (H) 05/15/2024    CREATININE 0.83 05/15/2024    EGFRNORACEVR >60 05/15/2024    GLUCOSE 70 (L) 05/15/2024    CALCIUM 9.8 05/15/2024    ALKPHOS 67 05/15/2024    LABPROT 7.0 05/15/2024    ALBUMIN 3.9 05/15/2024    BILIDIR 0.2 11/02/2021    IBILI 0.30 11/02/2021    AST 17 05/15/2024    ALT 22 05/15/2024    TSH 1.999 12/20/2022    MVNKHM9RMJR 0.88 09/15/2022        Lab Results   Component Value Date    HGBA1C 4.7 05/15/2024        Hematology:  Lab Results   Component Value Date    WBC 7.06 05/15/2024    HGB 14.9 05/15/2024    HCT 45.4 05/15/2024     05/15/2024       Lipid Panel:  Lab Results   Component Value Date    CHOL 174 05/15/2024    HDL 49 05/15/2024    .00 05/15/2024    TRIG 79 05/15/2024    TOTALCHOLEST 4 05/15/2024        Urine:  Lab Results   Component Value Date    COLORUA Colorless (A) 05/15/2024    APPEARANCEUA Clear 05/15/2024    SGUA 1.015 05/15/2024    PHUA 6.5 05/15/2024    PROTEINUA Negative 05/15/2024    GLUCOSEUA Normal 05/15/2024    KETONESUA Negative 05/15/2024    BLOODUA Negative 05/15/2024    NITRITESUA Negative 05/15/2024    LEUKOCYTESUR 25 (A) 05/15/2024    RBCUA 0-5 05/15/2024    WBCUA 0-5 05/15/2024    BACTERIA None Seen 05/15/2024    SQEPUA Trace (A) 05/15/2024    HYALINECASTS None Seen 05/15/2024        Assessment:          ICD-10-CM ICD-9-CM   1. Encounter for wellness examination  Z00.00 V70.0   2. Contact dermatitis, unspecified contact dermatitis type, unspecified trigger  L25.9 692.9        Plan:     1. Encounter for wellness examination  -     TSH  -     T4, Free  -     Hemoglobin A1C  -     Lipid Panel  -     CBC Auto Differential  -     Comprehensive Metabolic Panel  -     Vitamin D  -     Urinalysis, Reflex to Urine Culture  -     Estradiol  -     Ferritin  -      Luteinizing Hormone  -     Progesterone  -     Follicle Stimulating Hormone  -     Ambulatory referral/consult to Dermatology; Future; Expected date: 05/15/2024    2. Contact dermatitis, unspecified contact dermatitis type, unspecified trigger  Comments:  triamcinolone cream sent to pharmacy to use during flare up of contact dermatitis.  Orders:  -     triamcinolone acetonide 0.1% (KENALOG) 0.1 % cream; Apply topically 2 (two) times daily.  Dispense: 80 g; Refill: 5         Follow up in about 1 year (around 5/15/2025), or if symptoms worsen or fail to improve.    Future Appointments   Date Time Provider Department Center   5/15/2024 12:45 PM Miquel Be MD [facility]   1/21/2025 10:00 AM Alec Kapadia MD [facility]   5/15/2025  9:00 AM [facility]        Tia De Paz NP

## 2024-05-22 DIAGNOSIS — F33.1 MODERATE EPISODE OF RECURRENT MAJOR DEPRESSIVE DISORDER: ICD-10-CM

## 2024-05-22 DIAGNOSIS — F90.2 ATTENTION DEFICIT HYPERACTIVITY DISORDER (ADHD), COMBINED TYPE: ICD-10-CM

## 2024-05-24 ENCOUNTER — PATIENT MESSAGE (OUTPATIENT)
Dept: BEHAVIORAL HEALTH | Facility: CLINIC | Age: 34
End: 2024-05-24
Payer: COMMERCIAL

## 2024-05-31 NOTE — PROGRESS NOTES
Spoke to patient via [facility] messaging.  She reported she has taken Wellbutrin in the past but stopped due to side effects (specifically notes weight gain).  Per chart review, has failed lexapro (SE of sedation, bloating and poor energy/motivation), celexa (SE of fatigue).  No benefit from cymbalta or abilify thus far as well.  Will proceed with request for coverage of auvelity for treatment of major depression.

## 2024-06-03 DIAGNOSIS — F90.2 ATTENTION DEFICIT HYPERACTIVITY DISORDER (ADHD), COMBINED TYPE: ICD-10-CM

## 2024-06-03 DIAGNOSIS — F33.1 MODERATE EPISODE OF RECURRENT MAJOR DEPRESSIVE DISORDER: ICD-10-CM

## 2024-06-03 RX ORDER — DEXTROAMPHETAMINE SACCHARATE, AMPHETAMINE ASPARTATE, DEXTROAMPHETAMINE SULFATE AND AMPHETAMINE SULFATE 7.5; 7.5; 7.5; 7.5 MG/1; MG/1; MG/1; MG/1
30 TABLET ORAL 2 TIMES DAILY
Qty: 60 TABLET | Refills: 0 | Status: SHIPPED | OUTPATIENT
Start: 2024-06-03

## 2024-06-06 ENCOUNTER — PATIENT MESSAGE (OUTPATIENT)
Dept: BEHAVIORAL HEALTH | Facility: CLINIC | Age: 34
End: 2024-06-06
Payer: COMMERCIAL

## 2024-07-03 ENCOUNTER — OFFICE VISIT (OUTPATIENT)
Dept: DERMATOLOGY | Facility: CLINIC | Age: 34
End: 2024-07-03
Payer: COMMERCIAL

## 2024-07-03 VITALS — BODY MASS INDEX: 23.65 KG/M2 | HEIGHT: 62 IN | WEIGHT: 128.5 LBS

## 2024-07-03 DIAGNOSIS — L70.0 ACNE VULGARIS: Primary | ICD-10-CM

## 2024-07-03 DIAGNOSIS — L85.8 KERATOSIS PILARIS: ICD-10-CM

## 2024-07-03 PROCEDURE — 3008F BODY MASS INDEX DOCD: CPT | Mod: CPTII,S$GLB,, | Performed by: STUDENT IN AN ORGANIZED HEALTH CARE EDUCATION/TRAINING PROGRAM

## 2024-07-03 PROCEDURE — 1160F RVW MEDS BY RX/DR IN RCRD: CPT | Mod: CPTII,S$GLB,, | Performed by: STUDENT IN AN ORGANIZED HEALTH CARE EDUCATION/TRAINING PROGRAM

## 2024-07-03 PROCEDURE — 3044F HG A1C LEVEL LT 7.0%: CPT | Mod: CPTII,S$GLB,, | Performed by: STUDENT IN AN ORGANIZED HEALTH CARE EDUCATION/TRAINING PROGRAM

## 2024-07-03 PROCEDURE — 99204 OFFICE O/P NEW MOD 45 MIN: CPT | Mod: S$GLB,,, | Performed by: STUDENT IN AN ORGANIZED HEALTH CARE EDUCATION/TRAINING PROGRAM

## 2024-07-03 PROCEDURE — 99999 PR PBB SHADOW E&M-EST. PATIENT-LVL III: CPT | Mod: PBBFAC,,, | Performed by: STUDENT IN AN ORGANIZED HEALTH CARE EDUCATION/TRAINING PROGRAM

## 2024-07-03 PROCEDURE — 1159F MED LIST DOCD IN RCRD: CPT | Mod: CPTII,S$GLB,, | Performed by: STUDENT IN AN ORGANIZED HEALTH CARE EDUCATION/TRAINING PROGRAM

## 2024-07-03 PROCEDURE — G2211 COMPLEX E/M VISIT ADD ON: HCPCS | Mod: S$GLB,,, | Performed by: STUDENT IN AN ORGANIZED HEALTH CARE EDUCATION/TRAINING PROGRAM

## 2024-07-03 RX ORDER — DOXYCYCLINE HYCLATE 100 MG
100 TABLET ORAL DAILY
Qty: 90 TABLET | Refills: 0 | Status: SHIPPED | OUTPATIENT
Start: 2024-07-03

## 2024-07-03 RX ORDER — TRETINOIN 0.25 MG/G
CREAM TOPICAL NIGHTLY
Qty: 45 G | Refills: 3 | Status: SHIPPED | OUTPATIENT
Start: 2024-07-03

## 2024-07-03 RX ORDER — CLINDAMYCIN PHOSPHATE 10 MG/ML
SOLUTION TOPICAL DAILY
Qty: 60 EACH | Refills: 2 | Status: SHIPPED | OUTPATIENT
Start: 2024-07-03 | End: 2025-07-03

## 2024-07-03 NOTE — PROGRESS NOTES
Subjective:       Patient ID:  Nanci Clancy is a 33 y.o. female who presents for   Chief Complaint   Patient presents with    Skin Check    Spot     History of Present Illness: The patient presents with chief complaint of acne breakouts.  Location: face, chest  Duration: years, getting worse  Signs/Symptoms: red bumps, pus bumps, white heads, cystic lesions, scarring  Prior treatments: has tried several OTC washes, creams and serums with minimal improvement.     Patient also with rough bumpy red lesions on the thighs and lower legs, ongoing for years.       Spot        Review of Systems   Constitutional:  Negative for fever, chills and fatigue.   Skin:  Positive for itching, rash and dry skin.        Objective:    Physical Exam   Constitutional: She appears well-developed and well-nourished. No distress.   Neurological: She is alert and oriented to person, place, and time. She is not disoriented.   Psychiatric: She has a normal mood and affect.   Skin:   Areas Examined (abnormalities noted in diagram):   Head / Face Inspection Performed  Neck Inspection Performed  RUE Inspected  LUE Inspection Performed  RLE Inspected  LLE Inspection Performed                   Diagram Legend     Erythematous scaling macule/papule c/w actinic keratosis       Vascular papule c/w angioma      Pigmented verrucoid papule/plaque c/w seborrheic keratosis      Yellow umbilicated papule c/w sebaceous hyperplasia      Irregularly shaped tan macule c/w lentigo     1-2 mm smooth white papules consistent with Milia      Movable subcutaneous cyst with punctum c/w epidermal inclusion cyst      Subcutaneous movable cyst c/w pilar cyst      Firm pink to brown papule c/w dermatofibroma      Pedunculated fleshy papule(s) c/w skin tag(s)      Evenly pigmented macule c/w junctional nevus     Mildly variegated pigmented, slightly irregular-bordered macule c/w mildly atypical nevus      Flesh colored to evenly pigmented papule c/w  intradermal nevus       Pink pearly papule/plaque c/w basal cell carcinoma      Erythematous hyperkeratotic cursted plaque c/w SCC      Surgical scar with no sign of skin cancer recurrence      Open and closed comedones      Inflammatory papules and pustules      Verrucoid papule consistent consistent with wart     Erythematous eczematous patches and plaques     Dystrophic onycholytic nail with subungual debris c/w onychomycosis     Umbilicated papule    Erythematous-base heme-crusted tan verrucoid plaque consistent with inflamed seborrheic keratosis     Erythematous Silvery Scaling Plaque c/w Psoriasis     See annotation      Assessment / Plan:        Acne vulgaris  -     doxycycline (VIBRA-TABS) 100 MG tablet; Take 1 tablet (100 mg total) by mouth once daily.  Dispense: 90 tablet; Refill: 0  -     tretinoin (RETIN-A) 0.025 % cream; Apply topically every evening.  Dispense: 45 g; Refill: 3  -     clindamycin (CLEOCIN T) 1 % Swab; Apply topically once daily.  Dispense: 60 each; Refill: 2    Keratosis pilaris  Recommend using a mild, moisturizing soap as Keratosis pilaris is exacerbated by dry skin.  OTC keratolytics (Am lactin, Carmol, Ureamide, Kerasal) recommended for daily use after bath or shower.             Follow up in about 3 months (around 10/3/2024).

## 2024-07-08 DIAGNOSIS — F90.2 ATTENTION DEFICIT HYPERACTIVITY DISORDER (ADHD), COMBINED TYPE: ICD-10-CM

## 2024-07-08 DIAGNOSIS — F33.1 MODERATE EPISODE OF RECURRENT MAJOR DEPRESSIVE DISORDER: ICD-10-CM

## 2024-07-08 RX ORDER — DEXTROAMPHETAMINE SACCHARATE, AMPHETAMINE ASPARTATE, DEXTROAMPHETAMINE SULFATE AND AMPHETAMINE SULFATE 7.5; 7.5; 7.5; 7.5 MG/1; MG/1; MG/1; MG/1
30 TABLET ORAL 2 TIMES DAILY
Qty: 60 TABLET | Refills: 0 | Status: SHIPPED | OUTPATIENT
Start: 2024-07-08

## 2024-07-08 RX ORDER — DEXTROAMPHETAMINE SACCHARATE, AMPHETAMINE ASPARTATE, DEXTROAMPHETAMINE SULFATE AND AMPHETAMINE SULFATE 7.5; 7.5; 7.5; 7.5 MG/1; MG/1; MG/1; MG/1
30 TABLET ORAL 2 TIMES DAILY
Qty: 60 TABLET | Refills: 0 | OUTPATIENT
Start: 2024-07-08

## 2024-07-08 NOTE — TELEPHONE ENCOUNTER
----- Message from Holly Ford sent at 7/8/2024  9:40 AM CDT -----  Regarding: Refill  MEDICATION REFILL REQUEST:     Call urgency: routine    Patient name: Nanci     Patient phone number: [phone]    Requested Medication: Adderall     Has Pt contacted Pharmacy : yes    Preferred [facility]acy:[facility]    Last completed appt date 05/15    Next appt date:  07/29    Holly Ford  7/8/2024, 9:41 AM

## 2024-07-08 NOTE — TELEPHONE ENCOUNTER
----- Message from Holly Ford sent at 7/8/2024  9:40 AM CDT -----  Regarding: Refill  MEDICATION REFILL REQUEST:     Call urgency: routine    Patient name: Nanci     Patient phone number: [phone]    Requested Medication: Adderall     Has Pt contacted Pharmacy : yes    Preferred [facility]    Last completed appt date 05/15    Next appt date:  07/29    Holly Ford  7/8/2024, 9:41 AM

## 2024-07-08 NOTE — TELEPHONE ENCOUNTER
Pt has requested a refill on the following medication.    dextroamphetamine-amphetamine (ADDERALL) 30 mg Tab     LOV:5/15/24    NOV: none

## 2024-07-16 DIAGNOSIS — F90.2 ATTENTION DEFICIT HYPERACTIVITY DISORDER (ADHD), COMBINED TYPE: ICD-10-CM

## 2024-07-16 DIAGNOSIS — F33.1 MODERATE EPISODE OF RECURRENT MAJOR DEPRESSIVE DISORDER: ICD-10-CM

## 2024-07-16 NOTE — TELEPHONE ENCOUNTER
Please see attached refill request.    Next scheduled office visit: 7/29/2024.    Last office visit: 5/15/2024.     Thank you!

## 2024-07-29 ENCOUNTER — OFFICE VISIT (OUTPATIENT)
Dept: BEHAVIORAL HEALTH | Facility: CLINIC | Age: 34
End: 2024-07-29
Payer: COMMERCIAL

## 2024-07-29 DIAGNOSIS — F90.2 ATTENTION DEFICIT HYPERACTIVITY DISORDER (ADHD), COMBINED TYPE: ICD-10-CM

## 2024-07-29 DIAGNOSIS — F33.1 MODERATE EPISODE OF RECURRENT MAJOR DEPRESSIVE DISORDER: Primary | ICD-10-CM

## 2024-07-29 DIAGNOSIS — F41.1 GAD (GENERALIZED ANXIETY DISORDER): ICD-10-CM

## 2024-07-29 DIAGNOSIS — M54.12 BRACHIAL NEURITIS: Primary | ICD-10-CM

## 2024-07-29 PROCEDURE — 1159F MED LIST DOCD IN RCRD: CPT | Mod: CPTII,95,, | Performed by: STUDENT IN AN ORGANIZED HEALTH CARE EDUCATION/TRAINING PROGRAM

## 2024-07-29 PROCEDURE — 99214 OFFICE O/P EST MOD 30 MIN: CPT | Mod: 95,,, | Performed by: STUDENT IN AN ORGANIZED HEALTH CARE EDUCATION/TRAINING PROGRAM

## 2024-07-29 PROCEDURE — 1160F RVW MEDS BY RX/DR IN RCRD: CPT | Mod: CPTII,95,, | Performed by: STUDENT IN AN ORGANIZED HEALTH CARE EDUCATION/TRAINING PROGRAM

## 2024-07-29 PROCEDURE — 3044F HG A1C LEVEL LT 7.0%: CPT | Mod: CPTII,95,, | Performed by: STUDENT IN AN ORGANIZED HEALTH CARE EDUCATION/TRAINING PROGRAM

## 2024-07-29 NOTE — PROGRESS NOTES
"Outpatient Psychiatry Follow-Up Visit    7/29/2024    Clinical Status of Patient:  Outpatient (Ambulatory)    Chief Complaint:  Nanci Clancy is a 33 y.o. female who presents today for follow-up of depression, anxiety, and attention problems. Patient last seen for follow-up on 5/15/2024. Met with patient.      TELE PSYCHIATRY Disclaimer   *The patient was informed despite using HIPPA compliant technology there may be risks including security breach, technological failure, inability to perform a comprehensive physical exam which could delay or prevent an accurate diagnosis, and potential complications from treatment decisions rendered over a telemedical platform.   The patient was also informed of the relationship between the physician and patient and the respective role of any other health care provider with respect to management of the patient; and notified that the pt may decline to receive medical services by telemedicine and may withdraw from such care at any time.     Patient's Current location: [address] [place] [address]     In Case of Emergency pts next of kin  Name:Keith Clancy  Phone number:    [phone]   Visit type: Virtual visit with synchronous audio and video  Total time spent with patient: 32 minutes    Interval History and Content of Current Session:  Interim Events/Subjective Report/Content of Current Session:   Pt reports doing "not great"  overall.  Notes reaction to auvelity of "feeling completely out of it."  Feels "constantly drunk."  Reports "ok" mood, improved depression, "fair" anxiety.  Sleep "not good," frequent nighttime awakening, notes vivid dreams.  Notes yesterday, experienced sleep paralysis.  Appetite "not great," appetite erratic, weight fluctuating.  Energy "not good," motivation poor, +anhedonia.  Improved irritability, occasional hopelessness.  Denies SI/HI/AVH/paranoia, denies plan or desire for self harm or harm to others.  Reports somatic " "complaints of constipation and GI bleeding. Pt voices desire to adjust regimen to address ongoing symptoms.      Psychiatric Review of Systems-is patient experiencing or having changes in  See HPI above.     Review of Systems   PSYCHIATRIC: Pertinant items are noted in the narrative.  CONSTITUTIONAL: No weight gain or loss.  MUSCULOSKELETAL: +back pain, denies myalgias  NEUROLOGIC: No weakness, sensory changes, seizures, confusion, memory loss, tremor or other abnormal movements.  +headaches.   CARDIAC: No CP, no palpitations  RESPIRATORY: No shortness of breath.  CARDIOVASCULAR: No tachycardia or chest pain.  GASTROINTESTINAL: No nausea, vomiting, pain, constipation or diarrhea.    Past Medical, Family and Social History: The patient's past medical, family and social history have been reviewed and updated as appropriate within the electronic medical record - see encounter notes.    Compliance: good    Side effects: denies    Risk Parameters:  Patient reports no suicidal ideation  Patient reports no homicidal ideation  Patient reports no self-injurious behavior  Patient reports no violent behavior    Exam (detailed: at least 9 elements; comprehensive: all 15 elements)   Constitutional  Vitals:  Most recent vital signs, dated less than 90 days prior to this appointment, were reviewed.     There were no vitals filed for this visit.       General:   Constitutional: No acute distress, appears stated age, casually dressed    Neurologic:   Motor: moves upper extremities spontaneously and without difficulty, no abnormal involuntary movements observed  Gait: [facility] 2/2 virtual visit    Mental status examination:   Appearance: appears stated age, casually dressed, no acute distress  Behavior: unremarkable for situation, calm and cooperative  Mood: "ok"  Affect: mood congruent, constricted  Thought process: linear and goal directed  Associations: appropriate for conversation  Thought content: no plan or desire for self harm or " harm to others, denies paranoia, no delusional ideation volunteered  Perceptions: denies hallucinations or other altered perceptions  Orientation: oriented to day of week, month, year, location, and situation  Language: English, fluid  Attention: able to attend to interview  Insight: good  Judgement: good    PHQ9:  Over the last two weeks how often have you been bothered by little interest or pleasure in doing things: 2  Over the last two weeks how often have you been bothered by feeling down, depressed or hopeless: 3  PHQ-2 Total Score: 5  PHQ-9 Score: 18  PHQ-9 Interpretation: Moderately Severe        2/7/2024     3:04 PM 1/3/2024     9:35 AM 11/8/2023     3:19 PM   GAD7   1. Feeling nervous, anxious, or on edge? 3 3 3   2. Not being able to stop or control worrying? 3 2 3   3. Worrying too much about different things? 3 3 3   4. Trouble relaxing? 3 2 3   5. Being so restless that it is hard to sit still? 3 2 0   6. Becoming easily annoyed or irritable? 3 3 2   7. Feeling afraid as if something awful might happen? 0 0 0   8. If you checked off any problems, how difficult have these problems made it for you to do your work, take care of things at home, or get along with other people? 3 3 2   AGATA-7 Score 18 15 14     Assessment and Diagnosis   Status/Progress: Based on the examination today, the patient's problem(s) is/are inadequately controlled.  New problems have not been presented today.   Co-morbidities and Lack of compliance are not complicating management of the primary condition.  Number of separate conditions addressed during today's visit: 3 (depression improved but with SE of treatment, social anxiety new and uncontrolled, ADhD fair control).  Medication management: Yes: Modifying an existing prescription and Deciding to continue a pre-existing prescription.  Are referral(s) being ordered today: Yes.  Complexity (level) of medical decision making employed in the encounter: MODERATE.    General  Impression:    ICD-10-CM ICD-9-CM   1. Moderate episode of recurrent major depressive disorder  F33.1 296.32   2. AGATA (generalized anxiety disorder)  F41.1 300.02   3. Attention deficit hyperactivity disorder (ADHD), combined type  F90.2 314.01     Intervention/Counseling/Treatment Plan   Continue adderall 30mg bid  Continue cymbalta 30mg daily  Decrease auvelity 45-105mg to once daily  Pt would likely benefit from MAOI but significant changes will be needed due to high likelihood of interactions (will need to decrease dose of stimulant, stop auvelity and stop cymbalta), will discuss with patient at next visit  No need for PEC as pt is not an imminent danger to self or others or gravely disabled due to acute psychiatric illness  Discussed that pt should either call clinic for psychiatric crisis symptoms or present to nearest emergency room    Discussed with patient informed consent including diagnosis, risks and benefits of proposed treatment above vs. alternative treatments vs. no treatment, as well as serious and common side effects of these treatments, and the inherent unpredictability of individual responses to these treatments. The patient expresses understanding of the above and displays the capacity to agree with this current plan. Patient also agrees that, currently, the benefits outweigh the risks and would like to pursue treatment at this time, and had no other questions.    Instructions:  Take all medications as prescribed.    Abstain from recreational drugs and alcohol.  Present to ED or call 911 for SI/HI plan or intent, psychosis, or medical emergency.    Return to Clinic: Follow up in about 6 weeks (around 9/9/2024).    Total time:   The total time for services performed on the date of the encounter (including review of prior visit notes, review of notes from other providers, review of results from laboratory/imaging studies, face-to-face time with patient, and time spent on other activities directly  related to patient care): 32 minutes.    Miquel eB MD  [facility]

## 2024-08-07 ENCOUNTER — HOSPITAL ENCOUNTER (OUTPATIENT)
Dept: RADIOLOGY | Facility: HOSPITAL | Age: 34
Discharge: HOME | End: 2024-08-07
Attending: NEUROLOGICAL SURGERY
Payer: COMMERCIAL

## 2024-08-07 DIAGNOSIS — M54.12 BRACHIAL NEURITIS: ICD-10-CM

## 2024-08-07 PROCEDURE — 72141 MRI NECK SPINE W/O DYE: CPT | Mod: TC

## 2024-08-13 ENCOUNTER — PATIENT MESSAGE (OUTPATIENT)
Dept: BEHAVIORAL HEALTH | Facility: CLINIC | Age: 34
End: 2024-08-13
Payer: COMMERCIAL

## 2024-08-13 DIAGNOSIS — F33.1 MODERATE EPISODE OF RECURRENT MAJOR DEPRESSIVE DISORDER: Primary | ICD-10-CM

## 2024-08-13 DIAGNOSIS — F41.1 GAD (GENERALIZED ANXIETY DISORDER): ICD-10-CM

## 2024-08-16 RX ORDER — VENLAFAXINE HYDROCHLORIDE 75 MG/1
75 CAPSULE, EXTENDED RELEASE ORAL DAILY
Qty: 30 CAPSULE | Refills: 11 | Status: SHIPPED | OUTPATIENT
Start: 2024-08-16 | End: 2025-08-16

## 2024-08-16 NOTE — TELEPHONE ENCOUNTER
Spoke to pt by phone.  She reported Se from auvelity (headache) with no effect on symptoms.  Wants to consider alternative.  Will stop auvelity.  Will change cymbalta to effexor.  Pt in agree with these changes.  All questions answered.

## 2024-08-19 ENCOUNTER — TELEPHONE (OUTPATIENT)
Dept: BEHAVIORAL HEALTH | Facility: CLINIC | Age: 34
End: 2024-08-19
Payer: COMMERCIAL

## 2024-08-19 DIAGNOSIS — F33.1 MODERATE EPISODE OF RECURRENT MAJOR DEPRESSIVE DISORDER: ICD-10-CM

## 2024-08-19 DIAGNOSIS — F90.2 ATTENTION DEFICIT HYPERACTIVITY DISORDER (ADHD), COMBINED TYPE: Primary | ICD-10-CM

## 2024-08-19 DIAGNOSIS — L70.0 ACNE VULGARIS: ICD-10-CM

## 2024-08-19 RX ORDER — DEXTROAMPHETAMINE SACCHARATE, AMPHETAMINE ASPARTATE, DEXTROAMPHETAMINE SULFATE AND AMPHETAMINE SULFATE 7.5; 7.5; 7.5; 7.5 MG/1; MG/1; MG/1; MG/1
30 TABLET ORAL 2 TIMES DAILY
Qty: 60 TABLET | Refills: 0 | Status: SHIPPED | OUTPATIENT
Start: 2024-08-19

## 2024-08-19 RX ORDER — DEXTROAMPHETAMINE SACCHARATE, AMPHETAMINE ASPARTATE, DEXTROAMPHETAMINE SULFATE AND AMPHETAMINE SULFATE 7.5; 7.5; 7.5; 7.5 MG/1; MG/1; MG/1; MG/1
30 TABLET ORAL 2 TIMES DAILY
Qty: 60 TABLET | Refills: 0 | Status: SHIPPED | OUTPATIENT
Start: 2024-10-17

## 2024-08-19 RX ORDER — DEXTROAMPHETAMINE SACCHARATE, AMPHETAMINE ASPARTATE, DEXTROAMPHETAMINE SULFATE AND AMPHETAMINE SULFATE 7.5; 7.5; 7.5; 7.5 MG/1; MG/1; MG/1; MG/1
30 TABLET ORAL 2 TIMES DAILY
Qty: 60 TABLET | Refills: 0 | Status: SHIPPED | OUTPATIENT
Start: 2024-09-18

## 2024-08-19 NOTE — TELEPHONE ENCOUNTER
----- Message from Sade Booker MA sent at 8/19/2024  1:33 PM CDT -----    ----- Message -----  From: Chapin Gutiérrez  Sent: 8/19/2024   1:08 PM CDT  To: Manasa QUIROGA Staff    .Type:  RX Refill Request    Who Called: pt   Refill or New Rx:refill   RX Name and Strength:dextroamphetamine-amphetamine (ADDERALL) 30 mg Tab  How is the patient currently taking it? (ex. 1XDay):2x  Is this a 30 day or 90 day RX:30  Preferred Pharmacy with phone number:[id number] - [place], [place] - [address]  Local or Mail Order:local   Ordering Provider:manasa   Would the patient rather a call back or a response via [facility]? Call back   Best Call Back Number:[phone]  Additional Information:

## 2024-08-20 RX ORDER — DOXYCYCLINE HYCLATE 100 MG
100 TABLET ORAL DAILY
Qty: 90 TABLET | Refills: 0 | Status: SHIPPED | OUTPATIENT
Start: 2024-08-20

## 2024-09-18 DIAGNOSIS — L70.0 ACNE VULGARIS: ICD-10-CM

## 2024-09-18 RX ORDER — TRETINOIN 0.25 MG/G
CREAM TOPICAL NIGHTLY
Qty: 45 G | Refills: 3 | Status: SHIPPED | OUTPATIENT
Start: 2024-09-18

## 2024-09-23 DIAGNOSIS — F33.1 MODERATE EPISODE OF RECURRENT MAJOR DEPRESSIVE DISORDER: ICD-10-CM

## 2024-09-23 DIAGNOSIS — F90.2 ATTENTION DEFICIT HYPERACTIVITY DISORDER (ADHD), COMBINED TYPE: ICD-10-CM

## 2024-09-23 RX ORDER — DEXTROAMPHETAMINE SACCHARATE, AMPHETAMINE ASPARTATE, DEXTROAMPHETAMINE SULFATE AND AMPHETAMINE SULFATE 7.5; 7.5; 7.5; 7.5 MG/1; MG/1; MG/1; MG/1
30 TABLET ORAL 2 TIMES DAILY
Qty: 60 TABLET | Refills: 0 | OUTPATIENT
Start: 2024-09-23

## 2024-09-24 NOTE — TELEPHONE ENCOUNTER
----- Message from Mayuri Orozco sent at 9/24/2024 10:09 AM CDT -----  .Who Called: Nanci Clancy    Caller is requesting assistance/information from provider's office.    Symptoms (please be specific): n/a   How long has patient had these symptoms:  n/a  List of preferred pharmacies on file (remove unneeded): [unfilled]  If different, enter pharmacy into here including location and phone number: n/a      Preferred Method of Contact: Phone Call  Patient's Preferred Phone Number on File: [phone]   Best Call Back Number, if different:  Additional Information: Pt is calling to follow up on the status of her refill request. Pt sated she has reached out since last week and has not heard anything back as of yet. Pt stated she is completely out of her medication Pt stated she is having difficulty working. Please call to advise .

## 2024-09-26 ENCOUNTER — TELEPHONE (OUTPATIENT)
Dept: BEHAVIORAL HEALTH | Facility: CLINIC | Age: 34
End: 2024-09-26
Payer: COMMERCIAL

## 2024-09-26 DIAGNOSIS — F41.1 GAD (GENERALIZED ANXIETY DISORDER): ICD-10-CM

## 2024-09-26 DIAGNOSIS — F33.1 MODERATE EPISODE OF RECURRENT MAJOR DEPRESSIVE DISORDER: ICD-10-CM

## 2024-09-26 RX ORDER — VENLAFAXINE HYDROCHLORIDE 150 MG/1
150 CAPSULE, EXTENDED RELEASE ORAL DAILY
Qty: 30 CAPSULE | Refills: 5 | Status: SHIPPED | OUTPATIENT
Start: 2024-09-26 | End: 2025-09-26

## 2024-09-26 NOTE — TELEPHONE ENCOUNTER
Pt reports denied refill request of adderall. I explained to pt it was denied due to being a duplicate request. I asked pt if she contacted pharmacy to request medication refill, pt then stated she had not. I explained to pt importance of contacting pharmacy first for refill requests and that if there are any complications on the pharmacy side, to contact our office. Pt voiced understanding.  Pt then stated that within the past 2 weeks her mood has been horrible. Pt has had to take days off of work because the stress was too much. Pt voiced questions on effexor medication and if it can be adjusted. I confirmed pt has upcoming visit on 10/2/24 but that provider will be notified.           ----- Message from Masha Guajardo sent at 9/26/2024 10:37 AM CDT -----  .Who Called: Nanci Clancy        Preferred Method of Contact: Phone Call  Patient's Preferred Phone Number on File: [phone]   Best Call Back Number, if different:  Additional Information: pt said she been trying to contact pcp for one week  pt asking for a call back

## 2024-09-26 NOTE — TELEPHONE ENCOUNTER
Pt's call returned.  Pt confirmed that she's been having worse mood and poor energy.  Wants to change regimen to address this.  Provides IC for uptitration of Effexor XR to 150mg daily.  Will keep appointment scheduled for 10/2/2024.  All questions answered.

## 2024-10-02 ENCOUNTER — OFFICE VISIT (OUTPATIENT)
Dept: BEHAVIORAL HEALTH | Facility: CLINIC | Age: 34
End: 2024-10-02
Payer: COMMERCIAL

## 2024-10-02 VITALS
BODY MASS INDEX: 23.45 KG/M2 | WEIGHT: 128.19 LBS | OXYGEN SATURATION: 100 % | TEMPERATURE: 98 F | HEART RATE: 83 BPM | SYSTOLIC BLOOD PRESSURE: 112 MMHG | DIASTOLIC BLOOD PRESSURE: 78 MMHG

## 2024-10-02 DIAGNOSIS — F41.1 GAD (GENERALIZED ANXIETY DISORDER): ICD-10-CM

## 2024-10-02 DIAGNOSIS — F90.2 ATTENTION DEFICIT HYPERACTIVITY DISORDER (ADHD), COMBINED TYPE: ICD-10-CM

## 2024-10-02 DIAGNOSIS — F33.1 MODERATE EPISODE OF RECURRENT MAJOR DEPRESSIVE DISORDER: Primary | ICD-10-CM

## 2024-10-02 PROCEDURE — 1160F RVW MEDS BY RX/DR IN RCRD: CPT | Mod: CPTII,,, | Performed by: STUDENT IN AN ORGANIZED HEALTH CARE EDUCATION/TRAINING PROGRAM

## 2024-10-02 PROCEDURE — 3074F SYST BP LT 130 MM HG: CPT | Mod: CPTII,,, | Performed by: STUDENT IN AN ORGANIZED HEALTH CARE EDUCATION/TRAINING PROGRAM

## 2024-10-02 PROCEDURE — 99214 OFFICE O/P EST MOD 30 MIN: CPT | Mod: S$PBB,,, | Performed by: STUDENT IN AN ORGANIZED HEALTH CARE EDUCATION/TRAINING PROGRAM

## 2024-10-02 PROCEDURE — 99213 OFFICE O/P EST LOW 20 MIN: CPT | Mod: PBBFAC,PN | Performed by: STUDENT IN AN ORGANIZED HEALTH CARE EDUCATION/TRAINING PROGRAM

## 2024-10-02 PROCEDURE — 3044F HG A1C LEVEL LT 7.0%: CPT | Mod: CPTII,,, | Performed by: STUDENT IN AN ORGANIZED HEALTH CARE EDUCATION/TRAINING PROGRAM

## 2024-10-02 PROCEDURE — 3078F DIAST BP <80 MM HG: CPT | Mod: CPTII,,, | Performed by: STUDENT IN AN ORGANIZED HEALTH CARE EDUCATION/TRAINING PROGRAM

## 2024-10-02 PROCEDURE — 3008F BODY MASS INDEX DOCD: CPT | Mod: CPTII,,, | Performed by: STUDENT IN AN ORGANIZED HEALTH CARE EDUCATION/TRAINING PROGRAM

## 2024-10-02 PROCEDURE — 1159F MED LIST DOCD IN RCRD: CPT | Mod: CPTII,,, | Performed by: STUDENT IN AN ORGANIZED HEALTH CARE EDUCATION/TRAINING PROGRAM

## 2024-10-02 RX ORDER — DEXTROAMPHETAMINE SACCHARATE, AMPHETAMINE ASPARTATE, DEXTROAMPHETAMINE SULFATE AND AMPHETAMINE SULFATE 7.5; 7.5; 7.5; 7.5 MG/1; MG/1; MG/1; MG/1
30 TABLET ORAL 2 TIMES DAILY
Qty: 60 TABLET | Refills: 0 | Status: SHIPPED | OUTPATIENT
Start: 2024-12-23

## 2024-10-02 RX ORDER — DEXTROAMPHETAMINE SACCHARATE, AMPHETAMINE ASPARTATE, DEXTROAMPHETAMINE SULFATE AND AMPHETAMINE SULFATE 7.5; 7.5; 7.5; 7.5 MG/1; MG/1; MG/1; MG/1
30 TABLET ORAL 2 TIMES DAILY
Qty: 60 TABLET | Refills: 0 | Status: SHIPPED | OUTPATIENT
Start: 2024-11-24

## 2024-10-02 RX ORDER — DEXTROAMPHETAMINE SACCHARATE, AMPHETAMINE ASPARTATE, DEXTROAMPHETAMINE SULFATE AND AMPHETAMINE SULFATE 7.5; 7.5; 7.5; 7.5 MG/1; MG/1; MG/1; MG/1
30 TABLET ORAL 2 TIMES DAILY
Qty: 60 TABLET | Refills: 0 | Status: SHIPPED | OUTPATIENT
Start: 2024-10-25

## 2024-10-02 NOTE — PROGRESS NOTES
"Outpatient Psychiatry Follow-Up Visit    10/2/2024    Clinical Status of Patient:  Outpatient (Ambulatory)    Chief Complaint:  Nanci Clancy is a 33 y.o. female who presents today for follow-up of depression, anxiety, and attention problems. Patient last seen for follow-up on 5/15/2024. Met with patient.      Interval History and Content of Current Session:  Interim Events/Subjective Report/Content of Current Session:   Pt reports doing "ok"  overall.  Feeling slightly improved since increasing dose of effexor.  Notes appetite is unchange but eating more and having cravings for sugery/salty foods and drink.  Reports stressed mood, endorses currently feeling depressed, elevated anxiety.  Sleep increased. Appetite stable, weight stable, increased impulsive eating.  Energy low, motivation low, concentration "ok".  +anhedonia.  Endorses irritability, occasional hopelessness.  Denies SI/HI/AVH/paranoia, denies plan or desire for self harm or harm to others.  Denies SE from current regimen. Denies change in chronic somatic complaints. Pt unsure if she wants to pursue further medication changes.     Psychiatric Review of Systems-is patient experiencing or having changes in  See HPI above.     Review of Systems   PSYCHIATRIC: Pertinant items are noted in the narrative.  CONSTITUTIONAL: No weight gain or loss.  MUSCULOSKELETAL: +back pain, denies myalgias  NEUROLOGIC: No weakness, sensory changes, seizures, confusion, memory loss, tremor or other abnormal movements.  +headaches.   CARDIAC: No CP, no palpitations  RESPIRATORY: No shortness of breath.  CARDIOVASCULAR: No tachycardia or chest pain.  GASTROINTESTINAL: No nausea, vomiting, pain, constipation or diarrhea.    Past Medical, Family and Social History: The patient's past medical, family and social history have been reviewed and updated as appropriate within the electronic medical record - see encounter notes.    Compliance: good    Side effects: " "denies    Risk Parameters:  Patient reports no suicidal ideation  Patient reports no homicidal ideation  Patient reports no self-injurious behavior  Patient reports no violent behavior    Exam (detailed: at least 9 elements; comprehensive: all 15 elements)   Constitutional  Vitals:  Most recent vital signs, dated less than 90 days prior to this appointment, were reviewed.     Vitals:    10/02/24 1448   BP: 112/78   Pulse: 83   Temp: 97.7 °F (36.5 °C)   SpO2: 100%   Weight: 58.2 kg (128 lb 3.2 oz)          General:   Constitutional: No acute distress, appears stated age, casually dressed    Neurologic:   Motor: moves upper extremities spontaneously and without difficulty, no abnormal involuntary movements observed  Gait: normal gait and station    Mental status examination:   Appearance: appears stated age, casually dressed, no acute distress  Behavior: unremarkable for situation, calm and cooperative  Mood: "ok"  Affect: mood congruent, constricted  Thought process: linear and goal directed  Associations: appropriate for conversation  Thought content: no plan or desire for self harm or harm to others, denies paranoia, no delusional ideation volunteered  Perceptions: denies hallucinations or other altered perceptions  Orientation: oriented to day of week, month, year, location, and situation  Language: English, fluid  Attention: able to attend to interview  Insight: good  Judgement: good    PHQ9:  Over the last two weeks how often have you been bothered by little interest or pleasure in doing things: 2  Over the last two weeks how often have you been bothered by feeling down, depressed or hopeless: 3  PHQ-2 Total Score: 5  PHQ-9 Score: 18  PHQ-9 Interpretation: Moderately Severe        10/2/2024     2:44 PM 2/7/2024     3:04 PM 1/3/2024     9:35 AM   GAD7   1. Feeling nervous, anxious, or on edge? 3 3 3   2. Not being able to stop or control worrying? 3 3 2   3. Worrying too much about different things? 3 3 3   4. " Trouble relaxing? 3 3 2   5. Being so restless that it is hard to sit still? 3 3 2   6. Becoming easily annoyed or irritable? 3 3 3   7. Feeling afraid as if something awful might happen? 0 0 0   8. If you checked off any problems, how difficult have these problems made it for you to do your work, take care of things at home, or get along with other people? 2 3 3   AGATA-7 Score 18 18 15     Assessment and Diagnosis   Status/Progress: Based on the examination today, the patient's problem(s) is/are inadequately controlled.  New problems have not been presented today.   Co-morbidities and Lack of compliance are not complicating management of the primary condition.  Number of separate conditions addressed during today's visit: 3 (depression inadequately controlled, anxiety adequate but not ideally controlled, ADhD fair control).  Medication management: Yes: Refilling a prescription and Deciding to continue a pre-existing prescription.  Are referral(s) being ordered today: No.  Complexity (level) of medical decision making employed in the encounter: MODERATE.    General Impression:    ICD-10-CM ICD-9-CM   1. Moderate episode of recurrent major depressive disorder  F33.1 296.32   2. AGATA (generalized anxiety disorder)  F41.1 300.02   3. Attention deficit hyperactivity disorder (ADHD), combined type  F90.2 314.01     Intervention/Counseling/Treatment Plan   Continue adderall 30mg bid  Continue Effexor XR 150mg daily, dosage adjustment made about 1 wk ago  Strongly encouraged psychotherapy, will investigate resources available to pt in the [facility]  PDMP reviewed and demonstrated no abnormal filling patterns.   No need for PEC as pt is not an imminent danger to self or others or gravely disabled due to acute psychiatric illness  Discussed that pt should either call clinic for psychiatric crisis symptoms or present to nearest emergency room    Discussed with patient informed consent including diagnosis, risks and  benefits of proposed treatment above vs. alternative treatments vs. no treatment, as well as serious and common side effects of these treatments, and the inherent unpredictability of individual responses to these treatments. The patient expresses understanding of the above and displays the capacity to agree with this current plan. Patient also agrees that, currently, the benefits outweigh the risks and would like to pursue treatment at this time, and had no other questions.    Instructions:  Take all medications as prescribed.    Abstain from recreational drugs and alcohol.  Present to ED or call 911 for SI/HI plan or intent, psychosis, or medical emergency.    Return to Clinic: No follow-ups on file.    Total time:   The total time for services performed on the date of the encounter (including review of prior visit notes, review of notes from other providers, review of results from laboratory/imaging studies, face-to-face time with patient, and time spent on other activities directly related to patient care): 30 minutes.    Miquel Be MD  [facility]

## 2024-11-05 ENCOUNTER — OFFICE VISIT (OUTPATIENT)
Dept: DERMATOLOGY | Facility: CLINIC | Age: 34
End: 2024-11-05
Payer: COMMERCIAL

## 2024-11-05 VITALS — WEIGHT: 128.31 LBS | BODY MASS INDEX: 23.61 KG/M2 | HEIGHT: 62 IN

## 2024-11-05 DIAGNOSIS — D22.9 MULTIPLE BENIGN NEVI: ICD-10-CM

## 2024-11-05 DIAGNOSIS — L70.0 CYSTIC ACNE: Primary | ICD-10-CM

## 2024-11-05 DIAGNOSIS — Z12.83 SCREENING, MALIGNANT NEOPLASM, SKIN: ICD-10-CM

## 2024-11-05 PROCEDURE — 3044F HG A1C LEVEL LT 7.0%: CPT | Mod: CPTII,S$GLB,, | Performed by: STUDENT IN AN ORGANIZED HEALTH CARE EDUCATION/TRAINING PROGRAM

## 2024-11-05 PROCEDURE — 3008F BODY MASS INDEX DOCD: CPT | Mod: CPTII,S$GLB,, | Performed by: STUDENT IN AN ORGANIZED HEALTH CARE EDUCATION/TRAINING PROGRAM

## 2024-11-05 PROCEDURE — 99214 OFFICE O/P EST MOD 30 MIN: CPT | Mod: S$GLB,,, | Performed by: STUDENT IN AN ORGANIZED HEALTH CARE EDUCATION/TRAINING PROGRAM

## 2024-11-05 PROCEDURE — G2211 COMPLEX E/M VISIT ADD ON: HCPCS | Mod: S$GLB,,, | Performed by: STUDENT IN AN ORGANIZED HEALTH CARE EDUCATION/TRAINING PROGRAM

## 2024-11-05 PROCEDURE — 99999 PR PBB SHADOW E&M-EST. PATIENT-LVL III: CPT | Mod: PBBFAC,,, | Performed by: STUDENT IN AN ORGANIZED HEALTH CARE EDUCATION/TRAINING PROGRAM

## 2024-11-05 PROCEDURE — 1160F RVW MEDS BY RX/DR IN RCRD: CPT | Mod: CPTII,S$GLB,, | Performed by: STUDENT IN AN ORGANIZED HEALTH CARE EDUCATION/TRAINING PROGRAM

## 2024-11-05 PROCEDURE — 1159F MED LIST DOCD IN RCRD: CPT | Mod: CPTII,S$GLB,, | Performed by: STUDENT IN AN ORGANIZED HEALTH CARE EDUCATION/TRAINING PROGRAM

## 2024-11-05 RX ORDER — PREDNISONE 20 MG/1
TABLET ORAL
Qty: 50 TABLET | Refills: 0 | Status: SHIPPED | OUTPATIENT
Start: 2024-11-05

## 2024-11-05 RX ORDER — DOXYCYCLINE HYCLATE 100 MG
100 TABLET ORAL DAILY
Qty: 90 TABLET | Refills: 0 | Status: SHIPPED | OUTPATIENT
Start: 2024-11-05

## 2024-11-05 NOTE — PROGRESS NOTES
Subjective:       Patient ID:  Nanci Clancy is a 34 y.o. female who presents for   Chief Complaint   Patient presents with    Skin Check     History of Present Illness: The patient presents for follow up of skin check and acne, last seen on 7/3/24. Patient reports for her acne, she has been in a significant flare of symptoms with increased pus bumps and cystic lesions on the face. Previously on doxycycline and topicals which helped initially, but continues to be in a flare. Patient would also like a skin examination. Denies any rapidly growing, bleeding or non healing skin lesions.         Review of Systems   Constitutional:  Negative for fever and chills.   Skin:  Negative for itching, rash and dry skin.        Objective:    Physical Exam   Constitutional: She appears well-developed and well-nourished. No distress.   Neurological: She is alert and oriented to person, place, and time. She is not disoriented.   Psychiatric: She has a normal mood and affect.   Skin:   Areas Examined (abnormalities noted in diagram):   Head / Face Inspection Performed  Neck Inspection Performed  Chest / Axilla Inspection Performed  Abdomen Inspection Performed  Back Inspection Performed  RUE Inspected  LUE Inspection Performed  RLE Inspected  LLE Inspection Performed                   Diagram Legend     Erythematous scaling macule/papule c/w actinic keratosis       Vascular papule c/w angioma      Pigmented verrucoid papule/plaque c/w seborrheic keratosis      Yellow umbilicated papule c/w sebaceous hyperplasia      Irregularly shaped tan macule c/w lentigo     1-2 mm smooth white papules consistent with Milia      Movable subcutaneous cyst with punctum c/w epidermal inclusion cyst      Subcutaneous movable cyst c/w pilar cyst      Firm pink to brown papule c/w dermatofibroma      Pedunculated fleshy papule(s) c/w skin tag(s)      Evenly pigmented macule c/w junctional nevus     Mildly variegated pigmented, slightly  irregular-bordered macule c/w mildly atypical nevus      Flesh colored to evenly pigmented papule c/w intradermal nevus       Pink pearly papule/plaque c/w basal cell carcinoma      Erythematous hyperkeratotic cursted plaque c/w SCC      Surgical scar with no sign of skin cancer recurrence      Open and closed comedones      Inflammatory papules and pustules      Verrucoid papule consistent consistent with wart     Erythematous eczematous patches and plaques     Dystrophic onycholytic nail with subungual debris c/w onychomycosis     Umbilicated papule    Erythematous-base heme-crusted tan verrucoid plaque consistent with inflamed seborrheic keratosis     Erythematous Silvery Scaling Plaque c/w Psoriasis     See annotation      Assessment / Plan:        Cystic acne  -     predniSONE (DELTASONE) 20 MG tablet; Take 1 po qam with breakfast x 1 month then 1 po qoam with breakfast x 1 month  Dispense: 50 tablet; Refill: 0  -     doxycycline (VIBRA-TABS) 100 MG tablet; Take 1 tablet (100 mg total) by mouth once daily.  Dispense: 90 tablet; Refill: 0    Multiple benign nevi  Screening, malignant neoplasm, skin  Upper and lower body skin examination performed today including at least 10 points as noted in physical examination. No lesions suspicious for malignancy noted.  Reassurance provided.    Instructed patient to observe lesion(s) for changes and follow up in clinic if changes are noted. Patient to monitor skin at home for new or changing lesions and follow up in clinic if noted.           Follow up in about 3 months (around 2/5/2025).

## 2024-12-16 ENCOUNTER — OCHSNER VIRTUAL EMERGENCY DEPARTMENT (OUTPATIENT)
Facility: CLINIC | Age: 34
End: 2024-12-16

## 2024-12-16 ENCOUNTER — NURSE TRIAGE (OUTPATIENT)
Dept: ADMINISTRATIVE | Facility: CLINIC | Age: 34
End: 2024-12-16
Payer: COMMERCIAL

## 2024-12-16 ENCOUNTER — OCHSNER VIRTUAL EMERGENCY DEPARTMENT (OUTPATIENT)
Facility: CLINIC | Age: 34
End: 2024-12-16
Payer: COMMERCIAL

## 2024-12-16 DIAGNOSIS — Z04.9 CONDITION NOT FOUND: Primary | ICD-10-CM

## 2024-12-17 NOTE — TELEPHONE ENCOUNTER
Tosha Christine c/o hard skin lump to left side of facial cheek noticed 3 days ago. Squeezed lump last night, site was draining yellow clearish fluid/pus. Dark spot around bump & surrounding skin is yellow appears. +mild headache present. Advised pt per triage protocol to see PCP within next 24 hours. NT unable to schedule OV with PCP. Pt tried to do ODVV but could not get it to work via pt portal. Advised pt per Dr. Bjorn Dale, [place] OCP recommends pt see PCP or dermatologist this week. No available appts. Recommends UC or ODVV within next 24 hours. Home care and call back instructions provided per triage protocol. Pt v/u. Pt states in line for ODVV now.    Reason for Disposition   [1] Swelling is painful to touch AND [2] no fever    Additional Information   Negative: Sounds like a life-threatening emergency to the triager   Negative: Patient sounds very sick or weak to the triager   Negative: SEVERE pain (e.g., excruciating)   Negative: [1] Swelling is painful to touch AND [2] fever   Negative: [1] Swelling is red AND [2] fever   Negative: [1] Swelling is red AND [2] size > 2 inches (5.0 cm)  (Exception: Itchy area of skin.)   Negative: [1] Swelling of groin (inguinal area) AND [2] painful    Protocols used: Skin Lump or Localized Swelling-A-AH

## 2024-12-17 NOTE — PLAN OF CARE-OVED
[facility] Plan of Care Note    Referral source: Nurse On-Call      Reason for consult: Rash      Additional History: hx of cystic acne, called RN line to red bump on face x3d, expressed pus from it.  Now surrounding area hard, yellow per pt.      Disposition recommended: [facility] Visit      Additional Recommendations: pt will schedule on demand VV

## 2025-01-02 ENCOUNTER — OFFICE VISIT (OUTPATIENT)
Dept: BEHAVIORAL HEALTH | Facility: CLINIC | Age: 35
End: 2025-01-02
Payer: COMMERCIAL

## 2025-01-02 VITALS
RESPIRATION RATE: 18 BRPM | HEART RATE: 83 BPM | DIASTOLIC BLOOD PRESSURE: 72 MMHG | HEIGHT: 62 IN | OXYGEN SATURATION: 98 % | TEMPERATURE: 98 F | BODY MASS INDEX: 24.51 KG/M2 | SYSTOLIC BLOOD PRESSURE: 106 MMHG | WEIGHT: 133.19 LBS

## 2025-01-02 DIAGNOSIS — F33.1 MODERATE EPISODE OF RECURRENT MAJOR DEPRESSIVE DISORDER: ICD-10-CM

## 2025-01-02 DIAGNOSIS — F90.2 ATTENTION DEFICIT HYPERACTIVITY DISORDER (ADHD), COMBINED TYPE: ICD-10-CM

## 2025-01-02 DIAGNOSIS — F41.1 GAD (GENERALIZED ANXIETY DISORDER): ICD-10-CM

## 2025-01-02 DIAGNOSIS — G47.00 INSOMNIA, UNSPECIFIED TYPE: Primary | ICD-10-CM

## 2025-01-02 PROCEDURE — 3008F BODY MASS INDEX DOCD: CPT | Mod: CPTII,,, | Performed by: STUDENT IN AN ORGANIZED HEALTH CARE EDUCATION/TRAINING PROGRAM

## 2025-01-02 PROCEDURE — 99214 OFFICE O/P EST MOD 30 MIN: CPT | Mod: PBBFAC,PN | Performed by: STUDENT IN AN ORGANIZED HEALTH CARE EDUCATION/TRAINING PROGRAM

## 2025-01-02 PROCEDURE — 3078F DIAST BP <80 MM HG: CPT | Mod: CPTII,,, | Performed by: STUDENT IN AN ORGANIZED HEALTH CARE EDUCATION/TRAINING PROGRAM

## 2025-01-02 PROCEDURE — 1160F RVW MEDS BY RX/DR IN RCRD: CPT | Mod: CPTII,,, | Performed by: STUDENT IN AN ORGANIZED HEALTH CARE EDUCATION/TRAINING PROGRAM

## 2025-01-02 PROCEDURE — 99214 OFFICE O/P EST MOD 30 MIN: CPT | Mod: S$PBB,,, | Performed by: STUDENT IN AN ORGANIZED HEALTH CARE EDUCATION/TRAINING PROGRAM

## 2025-01-02 PROCEDURE — 1159F MED LIST DOCD IN RCRD: CPT | Mod: CPTII,,, | Performed by: STUDENT IN AN ORGANIZED HEALTH CARE EDUCATION/TRAINING PROGRAM

## 2025-01-02 PROCEDURE — 3074F SYST BP LT 130 MM HG: CPT | Mod: CPTII,,, | Performed by: STUDENT IN AN ORGANIZED HEALTH CARE EDUCATION/TRAINING PROGRAM

## 2025-01-02 RX ORDER — ZOLPIDEM TARTRATE 5 MG/1
5 TABLET ORAL NIGHTLY PRN
Qty: 14 TABLET | Refills: 0 | Status: SHIPPED | OUTPATIENT
Start: 2025-01-02 | End: 2025-01-16

## 2025-01-02 RX ORDER — DEXTROAMPHETAMINE SACCHARATE, AMPHETAMINE ASPARTATE, DEXTROAMPHETAMINE SULFATE AND AMPHETAMINE SULFATE 7.5; 7.5; 7.5; 7.5 MG/1; MG/1; MG/1; MG/1
30 TABLET ORAL 2 TIMES DAILY
Qty: 60 TABLET | Refills: 0 | Status: SHIPPED | OUTPATIENT
Start: 2025-01-02

## 2025-01-02 RX ORDER — DEXTROAMPHETAMINE SACCHARATE, AMPHETAMINE ASPARTATE, DEXTROAMPHETAMINE SULFATE AND AMPHETAMINE SULFATE 7.5; 7.5; 7.5; 7.5 MG/1; MG/1; MG/1; MG/1
30 TABLET ORAL 2 TIMES DAILY
Qty: 60 TABLET | Refills: 0 | Status: SHIPPED | OUTPATIENT
Start: 2025-03-03

## 2025-01-02 RX ORDER — DEXTROAMPHETAMINE SACCHARATE, AMPHETAMINE ASPARTATE, DEXTROAMPHETAMINE SULFATE AND AMPHETAMINE SULFATE 7.5; 7.5; 7.5; 7.5 MG/1; MG/1; MG/1; MG/1
30 TABLET ORAL 2 TIMES DAILY
Qty: 60 TABLET | Refills: 0 | Status: SHIPPED | OUTPATIENT
Start: 2025-02-01

## 2025-01-02 RX ORDER — VENLAFAXINE HYDROCHLORIDE 150 MG/1
150 CAPSULE, EXTENDED RELEASE ORAL DAILY
Qty: 30 CAPSULE | Refills: 5 | Status: SHIPPED | OUTPATIENT
Start: 2025-01-02 | End: 2026-01-02

## 2025-01-02 NOTE — PROGRESS NOTES
"Outpatient Psychiatry Follow-Up Visit    1/2/2025    Clinical Status of Patient:  Outpatient (Ambulatory)    Chief Complaint:  Nanci Clancy is a 34 y.o. female who presents today for follow-up of depression, anxiety, and attention problems. Patient last seen for follow-up on 10/2/2024. Met with patient.      Interval History and Content of Current Session:  Interim Events/Subjective Report/Content of Current Session:   Pt reports doing "not great"  overall.  Reports "blah" mood, stable anxiety.  Sleep 6-7 hrs nightly, sleep latency <30 minutes, frequent nighttime awakenings (2-3x nightly), 1x nocturia, fatigue on awakenings, denies snoring, denies witnessed apnea, notes excessive daytime sleepiness, reports worsening cognition (especially memory).   Appetite much higher, weight variable (gaining and losing).  2-3 cups coffee per day.  2-3 crinks alcohol per week.  Energy low, motivation poor, concentration poor.  Endorses irritability, denies hopelessness.  Denies SI/HI/AVH/paranoia, denies plan or desire for self harm or harm to others.  Denies SE from current regimen. Denies change in chronic somatic complaints. Pt voices desire to adjust regimen to address ongoing symptoms.      Psychiatric Review of Systems-is patient experiencing or having changes in  See HPI above.     Review of Systems   PSYCHIATRIC: Pertinant items are noted in the narrative.  CONSTITUTIONAL: No weight gain or loss.  MUSCULOSKELETAL: +back pain, denies myalgias  NEUROLOGIC: No weakness, sensory changes, seizures, confusion, memory loss, tremor or other abnormal movements.  +headaches.   CARDIAC: No CP, no palpitations  RESPIRATORY: No shortness of breath.  CARDIOVASCULAR: No tachycardia or chest pain.  GASTROINTESTINAL: No nausea, vomiting, pain, constipation or diarrhea.    Past Medical, Family and Social History: The patient's past medical, family and social history have been reviewed and updated as appropriate within the " "electronic medical record - see encounter notes.    Compliance: good    Side effects: denies    Risk Parameters:  Patient reports no suicidal ideation  Patient reports no homicidal ideation  Patient reports no self-injurious behavior  Patient reports no violent behavior    Exam (detailed: at least 9 elements; comprehensive: all 15 elements)   Constitutional  Vitals:  Most recent vital signs, dated less than 90 days prior to this appointment, were reviewed.     Vitals:    01/02/25 1116   BP: 106/72   Pulse: 83   Resp: 18   Temp: 98.1 °F (36.7 °C)   SpO2: 98%   Weight: 60.4 kg (133 lb 3.2 oz)   Height: 5' 2" (1.575 m)        General:   Constitutional: No acute distress, appears stated age, casually dressed    Neurologic:   Motor: moves upper extremities spontaneously and without difficulty, no abnormal involuntary movements observed  Gait: normal gait and station    Mental status examination:   Appearance: appears stated age, casually dressed, no acute distress  Behavior: unremarkable for situation, calm and cooperative, yawning throughout  Mood: "not great"  Affect: mood congruent, constricted  Thought process: linear and goal directed  Associations: appropriate for conversation  Thought content: no plan or desire for self harm or harm to others, denies paranoia, no delusional ideation volunteered  Perceptions: denies hallucinations or other altered perceptions  Orientation: oriented to day of week, month, year, location, and situation  Language: English, fluid  Attention: able to attend to interview  Insight: good  Judgement: good    PHQ9:  Over the last two weeks how often have you been bothered by little interest or pleasure in doing things: 1  Over the last two weeks how often have you been bothered by feeling down, depressed or hopeless: 2  PHQ-2 Total Score: 3  PHQ-9 Score: 15  PHQ-9 Interpretation: Moderately Severe        1/2/2025    11:17 AM 10/2/2024     2:44 PM 2/7/2024     3:04 PM   GAD7   1. Feeling " nervous, anxious, or on edge? 3 3 3   2. Not being able to stop or control worrying? 3 3 3   3. Worrying too much about different things? 3 3 3   4. Trouble relaxing? 3 3 3   5. Being so restless that it is hard to sit still? 1 3 3   6. Becoming easily annoyed or irritable? 2 3 3   7. Feeling afraid as if something awful might happen? 0 0 0   8. If you checked off any problems, how difficult have these problems made it for you to do your work, take care of things at home, or get along with other people? 2 2 3   AGATA-7 Score 15 18 18     Assessment and Diagnosis   Status/Progress: Based on the examination today, the patient's problem(s) is/are inadequately controlled.  New problems have been presented today.   Co-morbidities and Lack of compliance are not complicating management of the primary condition.  Number of separate conditions addressed during today's visit: 2 (sleep uncontrolled, ADhD fair control).  Medication management: Yes: Starting a medication, Refilling a prescription, and Deciding to continue a pre-existing prescription.  Are referral(s) being ordered today: No.  Complexity (level) of medical decision making employed in the encounter: MODERATE.    General Impression:    ICD-10-CM ICD-9-CM   1. Insomnia, unspecified type  G47.00 780.52   2. Moderate episode of recurrent major depressive disorder  F33.1 296.32   3. AGATA (generalized anxiety disorder)  F41.1 300.02   4. Attention deficit hyperactivity disorder (ADHD), combined type  F90.2 314.01       Intervention/Counseling/Treatment Plan   Suspect that sleep insufficiency is primary cause of pt's reported symptoms (fatigue, excessive sleepiness, brain fog)  Continue adderall 30mg bid  Continue Effexor XR 150mg daily  Start ambien 5mg nightly, discussed potential SE including but not limited to sedation, risk of falls, abnormal behaviors in sleep, vivid dreaming, recommended that pt not take medication with other sedating substances including alcohol and  OTC products  PDMP reviewed and demonstrated no abnormal filling patterns.   No need for PEC as pt is not an imminent danger to self or others or gravely disabled due to acute psychiatric illness  Discussed that pt should either call clinic for psychiatric crisis symptoms or present to nearest emergency room    Discussed with patient informed consent including diagnosis, risks and benefits of proposed treatment above vs. alternative treatments vs. no treatment, as well as serious and common side effects of these treatments, and the inherent unpredictability of individual responses to these treatments. The patient expresses understanding of the above and displays the capacity to agree with this current plan. Patient also agrees that, currently, the benefits outweigh the risks and would like to pursue treatment at this time, and had no other questions.    Instructions:  Take all medications as prescribed.    Abstain from recreational drugs and alcohol.  Present to ED or call 911 for SI/HI plan or intent, psychosis, or medical emergency.    Return to Clinic: Follow up in about 2 months (around 3/2/2025).    Total time:   The total time for services performed on the date of the encounter (including review of prior visit notes, review of notes from other providers, review of results from laboratory/imaging studies, face-to-face time with patient, and time spent on other activities directly related to patient care): 30 minutes.    Miquel Be MD  [facility]

## 2025-01-12 DIAGNOSIS — L70.0 ACNE VULGARIS: ICD-10-CM

## 2025-01-14 RX ORDER — CLINDAMYCIN PHOSPHATE 10 MG/ML
SOLUTION TOPICAL DAILY
Qty: 60 EACH | Refills: 2 | Status: SHIPPED | OUTPATIENT
Start: 2025-01-14 | End: 2026-01-14

## 2025-01-28 ENCOUNTER — PATIENT MESSAGE (OUTPATIENT)
Dept: DERMATOLOGY | Facility: CLINIC | Age: 35
End: 2025-01-28
Payer: COMMERCIAL

## 2025-01-28 ENCOUNTER — PATIENT MESSAGE (OUTPATIENT)
Dept: BEHAVIORAL HEALTH | Facility: CLINIC | Age: 35
End: 2025-01-28
Payer: COMMERCIAL

## 2025-01-28 DIAGNOSIS — F41.1 GAD (GENERALIZED ANXIETY DISORDER): ICD-10-CM

## 2025-01-28 DIAGNOSIS — F33.1 MODERATE EPISODE OF RECURRENT MAJOR DEPRESSIVE DISORDER: Primary | ICD-10-CM

## 2025-01-30 RX ORDER — VENLAFAXINE HYDROCHLORIDE 75 MG/1
CAPSULE, EXTENDED RELEASE ORAL
Qty: 30 CAPSULE | Refills: 5 | Status: SHIPPED | OUTPATIENT
Start: 2025-01-30

## 2025-01-30 RX ORDER — VENLAFAXINE HYDROCHLORIDE 37.5 MG/1
CAPSULE, EXTENDED RELEASE ORAL
Qty: 30 CAPSULE | Refills: 11 | Status: SHIPPED | OUTPATIENT
Start: 2025-01-30

## 2025-02-06 DIAGNOSIS — F33.1 MODERATE EPISODE OF RECURRENT MAJOR DEPRESSIVE DISORDER: ICD-10-CM

## 2025-02-06 DIAGNOSIS — F90.2 ATTENTION DEFICIT HYPERACTIVITY DISORDER (ADHD), COMBINED TYPE: ICD-10-CM

## 2025-02-06 RX ORDER — DEXTROAMPHETAMINE SACCHARATE, AMPHETAMINE ASPARTATE, DEXTROAMPHETAMINE SULFATE AND AMPHETAMINE SULFATE 7.5; 7.5; 7.5; 7.5 MG/1; MG/1; MG/1; MG/1
30 TABLET ORAL 2 TIMES DAILY
Qty: 60 TABLET | Refills: 0 | Status: SHIPPED | OUTPATIENT
Start: 2025-02-06

## 2025-02-10 ENCOUNTER — OCHSNER VIRTUAL EMERGENCY DEPARTMENT (OUTPATIENT)
Facility: CLINIC | Age: 35
End: 2025-02-10
Payer: COMMERCIAL

## 2025-02-10 NOTE — PLAN OF CARE-OVED
[facility] Plan of Care Note  Encounter opened in error.    Please disregard.  
Patent

## 2025-02-10 NOTE — PLAN OF CARE-OVED
[facility] Plan of Care Note    REncounter opened in error.    Please disregard.

## 2025-02-12 ENCOUNTER — LAB VISIT (OUTPATIENT)
Dept: LAB | Facility: HOSPITAL | Age: 35
End: 2025-02-12
Payer: COMMERCIAL

## 2025-02-12 DIAGNOSIS — F33.1 MODERATE EPISODE OF RECURRENT MAJOR DEPRESSIVE DISORDER: ICD-10-CM

## 2025-02-12 LAB
25(OH)D3+25(OH)D2 SERPL-MCNC: 33 NG/ML (ref 30–80)
ALBUMIN SERPL-MCNC: 3.9 G/DL (ref 3.5–5)
ALBUMIN/GLOB SERPL: 1.3 RATIO (ref 1.1–2)
ALP SERPL-CCNC: 60 UNIT/L (ref 40–150)
ALT SERPL-CCNC: 16 UNIT/L (ref 0–55)
ANION GAP SERPL CALC-SCNC: 5 MEQ/L
AST SERPL-CCNC: 18 UNIT/L (ref 5–34)
BASOPHILS # BLD AUTO: 0.04 X10(3)/MCL
BASOPHILS NFR BLD AUTO: 0.6 %
BILIRUB SERPL-MCNC: 0.4 MG/DL
BUN SERPL-MCNC: 20.8 MG/DL (ref 7–18.7)
CALCIUM SERPL-MCNC: 9.1 MG/DL (ref 8.4–10.2)
CHLORIDE SERPL-SCNC: 103 MMOL/L (ref 98–107)
CO2 SERPL-SCNC: 30 MMOL/L (ref 22–29)
CREAT SERPL-MCNC: 0.71 MG/DL (ref 0.55–1.02)
CREAT/UREA NIT SERPL: 29
EOSINOPHIL # BLD AUTO: 0.07 X10(3)/MCL (ref 0–0.9)
EOSINOPHIL NFR BLD AUTO: 1.1 %
ERYTHROCYTE [DISTWIDTH] IN BLOOD BY AUTOMATED COUNT: 11.7 % (ref 11.5–17)
FOLATE SERPL-MCNC: 5.7 NG/ML (ref 7–31.4)
GFR SERPLBLD CREATININE-BSD FMLA CKD-EPI: >60 ML/MIN/1.73/M2
GLOBULIN SER-MCNC: 3.1 GM/DL (ref 2.4–3.5)
GLUCOSE SERPL-MCNC: 56 MG/DL (ref 74–100)
HCT VFR BLD AUTO: 42.7 % (ref 37–47)
HGB BLD-MCNC: 14.3 G/DL (ref 12–16)
IMM GRANULOCYTES # BLD AUTO: 0 X10(3)/MCL (ref 0–0.04)
IMM GRANULOCYTES NFR BLD AUTO: 0 %
LYMPHOCYTES # BLD AUTO: 2.22 X10(3)/MCL (ref 0.6–4.6)
LYMPHOCYTES NFR BLD AUTO: 33.9 %
MCH RBC QN AUTO: 30.6 PG (ref 27–31)
MCHC RBC AUTO-ENTMCNC: 33.5 G/DL (ref 33–36)
MCV RBC AUTO: 91.2 FL (ref 80–94)
MONOCYTES # BLD AUTO: 0.48 X10(3)/MCL (ref 0.1–1.3)
MONOCYTES NFR BLD AUTO: 7.3 %
NEUTROPHILS # BLD AUTO: 3.73 X10(3)/MCL (ref 2.1–9.2)
NEUTROPHILS NFR BLD AUTO: 57.1 %
PLATELET # BLD AUTO: 257 X10(3)/MCL (ref 130–400)
PMV BLD AUTO: 9.8 FL (ref 7.4–10.4)
POTASSIUM SERPL-SCNC: 4.4 MMOL/L (ref 3.5–5.1)
PROT SERPL-MCNC: 7 GM/DL (ref 6.4–8.3)
RBC # BLD AUTO: 4.68 X10(6)/MCL (ref 4.2–5.4)
SODIUM SERPL-SCNC: 138 MMOL/L (ref 136–145)
T3FREE SERPL-MCNC: 2.4 PG/ML (ref 1.58–3.91)
T4 FREE SERPL-MCNC: 0.89 NG/DL (ref 0.7–1.48)
TSH SERPL-ACNC: 1.7 UIU/ML (ref 0.35–4.94)
VIT B12 SERPL-MCNC: 789 PG/ML (ref 213–816)
WBC # BLD AUTO: 6.54 X10(3)/MCL (ref 4.5–11.5)

## 2025-02-12 PROCEDURE — 84481 FREE ASSAY (FT-3): CPT

## 2025-02-12 PROCEDURE — 84439 ASSAY OF FREE THYROXINE: CPT

## 2025-02-12 PROCEDURE — 82746 ASSAY OF FOLIC ACID SERUM: CPT

## 2025-02-12 PROCEDURE — 85025 COMPLETE CBC W/AUTO DIFF WBC: CPT

## 2025-02-12 PROCEDURE — 82306 VITAMIN D 25 HYDROXY: CPT

## 2025-02-12 PROCEDURE — 80053 COMPREHEN METABOLIC PANEL: CPT

## 2025-02-12 PROCEDURE — 36415 COLL VENOUS BLD VENIPUNCTURE: CPT

## 2025-02-12 PROCEDURE — 82607 VITAMIN B-12: CPT

## 2025-02-12 PROCEDURE — 84443 ASSAY THYROID STIM HORMONE: CPT

## 2025-02-13 ENCOUNTER — NURSE TRIAGE (OUTPATIENT)
Dept: ADMINISTRATIVE | Facility: CLINIC | Age: 35
End: 2025-02-13
Payer: COMMERCIAL

## 2025-02-13 ENCOUNTER — OFFICE VISIT (OUTPATIENT)
Dept: URGENT CARE | Facility: CLINIC | Age: 35
End: 2025-02-13
Payer: COMMERCIAL

## 2025-02-13 ENCOUNTER — OCHSNER VIRTUAL EMERGENCY DEPARTMENT (OUTPATIENT)
Facility: CLINIC | Age: 35
End: 2025-02-13
Payer: COMMERCIAL

## 2025-02-13 VITALS
HEART RATE: 75 BPM | BODY MASS INDEX: 24.29 KG/M2 | OXYGEN SATURATION: 100 % | DIASTOLIC BLOOD PRESSURE: 86 MMHG | WEIGHT: 132 LBS | RESPIRATION RATE: 18 BRPM | SYSTOLIC BLOOD PRESSURE: 128 MMHG | HEIGHT: 62 IN | TEMPERATURE: 99 F

## 2025-02-13 DIAGNOSIS — R42 DIZZINESS: ICD-10-CM

## 2025-02-13 DIAGNOSIS — R42 VERTIGO: Primary | ICD-10-CM

## 2025-02-13 LAB
CTP QC/QA: YES
SARS CORONAVIRUS 2 ANTIGEN: NEGATIVE

## 2025-02-13 RX ORDER — MECLIZINE HYDROCHLORIDE 25 MG/1
25 TABLET ORAL 3 TIMES DAILY PRN
Qty: 21 TABLET | Refills: 0 | Status: SHIPPED | OUTPATIENT
Start: 2025-02-13 | End: 2025-02-13

## 2025-02-13 RX ORDER — MECLIZINE HYDROCHLORIDE 25 MG/1
25 TABLET ORAL 3 TIMES DAILY PRN
Qty: 21 TABLET | Refills: 0 | Status: SHIPPED | OUTPATIENT
Start: 2025-02-13 | End: 2025-02-20

## 2025-02-13 NOTE — TELEPHONE ENCOUNTER
"Patient states onset this AM, 02/13/25, of "feeling weird", dizziness, and severe dizziness when she turns her head. Patient notes tingling in her lips, hands and wrist and nausea when walking. Patient also states c/o her "tongue feeling weird, severe neck pain and "having trouble expressing words. Patient states she ate lunch and symptoms persists.  Patient states her dosage of Effexor XR was decreased approximately one (1) week ago and is also using Adderall 30 mg    Case Disposition advised to GO TO ED/C NOW (OR TO OFFICE WITH PCP APPROVAL). [facility] On Call Provider, Dr. David Perez, advised for patient to Go to ED today for evaluation.    Patient advised to Go to ED Today for evaluation, have another adult drive to facility and to follow up with her PCP. Patient also advised to contact the [facility] on Call [facility] for any worsening symptoms. Patient states understanding of care advice.     Reason for Disposition   SEVERE headache or neck pain    Additional Information   Negative: SEVERE difficulty breathing (e.g., struggling for each breath, speaks in single words)   Negative: Shock suspected (e.g., cold/pale/clammy skin, too weak to stand, low BP, rapid pulse)   Negative: Difficult to awaken or acting confused (e.g., disoriented, slurred speech)   Negative: Fainted, and still feels dizzy afterwards   Negative: Overdose (accidental or intentional) of medications   Negative: New neurologic deficit that is present now: * Weakness of the face, arm, or leg on one side of the body * Numbness of the face, arm, or leg on one side of the body * Loss of speech or garbled speech   Negative: Heart beating < 50 beats per minute OR > 140 beats per minute   Negative: Sounds like a life-threatening emergency to the triager   Negative: Chest pain   Negative: Rectal bleeding, bloody stool, or tarry-black stool   Negative: Vomiting is main symptom   Negative: Diarrhea is main symptom   Negative: Headache is main symptom   " Negative: Heat exhaustion suspected (i.e., dehydration from heat exposure)   Negative: Patient states that they are having an anxiety or panic attack   Negative: Dizziness from low blood sugar (i.e., < 60 mg/dL or 3.5 mmol/L)   Negative: SEVERE dizziness (e.g., unable to stand, requires support to walk, feels like passing out now)    Protocols used: Dizziness-A-OH

## 2025-02-13 NOTE — PLAN OF CARE-OVED
"[facility] Emergency Department Plan of Care Note    Referral source: Nurse On-Call      Reason for consult: Dizziness      Additional History: Per nurse on call, Patient states onset this AM, 02/13/25, of "feeling weird", dizziness, and severe dizziness when she turns her head. Patient notes tingling in her lips, hands and wrist and nausea when walking. Patient also states c/o her "tongue feeling weird, and "having trouble expressing words. Patient states her dosage of Effexor was decreased approximately one (1) week ago and is also using Adderall.       Disposition recommended: Emergency Department      "

## 2025-02-14 NOTE — PROGRESS NOTES
"Subjective:      Patient ID: Nanci Clancy is a 34 y.o. female.    Vitals:  height is 5' 2" (1.575 m) and weight is 59.9 kg (132 lb). Her oral temperature is 98.5 °F (36.9 °C). Her blood pressure is 128/86 and her pulse is 75. Her respiration is 18 and oxygen saturation is 100%.     Chief Complaint: Dizziness     Patient is a 34 y.o. female who presents to urgent care with complaints of intermittent Left sided chest pain and neck pain since yesterday, dizzy spells, generalized weakness, nausea, mouth tingling since this morning. Alleviating factors include none. Patient denies vomiting, diarrhea, shortness of breath, fever, cough, congestion.  Patient denied any chest pain or neck pain at time of exam but states when she moves her head she becomes dizzy.  Patient also admits to having a history of anxiety.      Constitution: Negative. Negative for fatigue and fever.   HENT: Negative.  Negative for congestion, postnasal drip, sinus pain, sinus pressure and sore throat.    Neck: neck negative.   Cardiovascular: Negative.    Eyes: Negative.    Respiratory:  Positive for chest tightness. Negative for cough.    Gastrointestinal: Negative.    Endocrine: negative.   Genitourinary: Negative.    Musculoskeletal: Negative.    Skin: Negative.    Allergic/Immunologic: Negative.    Neurological:  Positive for dizziness.   Hematologic/Lymphatic: Negative.    Psychiatric/Behavioral: Negative.        Objective:     Physical Exam   Constitutional: She is oriented to person, place, and time. She appears well-developed. She is cooperative. She does not appear ill.   HENT:   Head: Normocephalic and atraumatic.   Ears:   Right Ear: Hearing, tympanic membrane, external ear and ear canal normal.   Left Ear: Hearing, tympanic membrane, external ear and ear canal normal.   Nose: Nose normal. No mucosal edema, rhinorrhea, nasal deformity or congestion. No epistaxis. Right sinus exhibits no maxillary sinus tenderness and no frontal " sinus tenderness. Left sinus exhibits no maxillary sinus tenderness and no frontal sinus tenderness.   Mouth/Throat: Uvula is midline, oropharynx is clear and moist and mucous membranes are normal. No trismus in the jaw. Normal dentition. No uvula swelling. No posterior oropharyngeal erythema.   Eyes: Conjunctivae and lids are normal. Lids are everted and swept, no foreign bodies found. Right eye exhibits nystagmus. Left eye exhibits nystagmus. Extraocular movement intact vision grossly intact gaze aligned appropriately   Neck: Trachea normal and phonation normal. Neck supple.   Cardiovascular: Normal rate, regular rhythm, normal heart sounds and normal pulses.   Pulmonary/Chest: Effort normal and breath sounds normal. No respiratory distress.   Abdominal: Normal appearance and bowel sounds are normal. Soft.   Musculoskeletal: Normal range of motion.         General: Normal range of motion.   Lymphadenopathy:     She has no cervical adenopathy.   Neurological: no focal deficit. She is alert and oriented to person, place, and time. She exhibits normal muscle tone.   Skin: Skin is warm, dry and intact. Capillary refill takes less than 2 seconds.   Psychiatric: Her speech is normal and behavior is normal. Judgment and thought content normal.   Nursing note and vitals reviewed.         Previous History      Review of patient's allergies indicates:  No Known Allergies    Past Medical History:   Diagnosis Date    ADHD (attention deficit hyperactivity disorder)     Anxiety     Depression      Current Outpatient Medications   Medication Instructions    clindamycin (CLEOCIN T) 1 % Swab Topical (Top), Daily    dextroamphetamine-amphetamine (ADDERALL) 30 mg Tab 30 mg, Oral, 2 times daily    [START ON 3/3/2025] dextroamphetamine-amphetamine (ADDERALL) 30 mg Tab 30 mg, Oral, 2 times daily    dextroamphetamine-amphetamine (ADDERALL) 30 mg Tab 30 mg, Oral, 2 times daily    gabapentin (NEURONTIN) 300 mg, Oral, 3 times daily PRN     "meclizine (ANTIVERT) 25 mg, Oral, 3 times daily PRN    tretinoin (RETIN-A) 0.025 % cream Topical (Top), Nightly    venlafaxine (EFFEXOR-XR) 37.5 MG 24 hr capsule Take 112.5mg (75 + 37.5mg) by mouth daily.    venlafaxine (EFFEXOR-XR) 75 MG 24 hr capsule Take 112.5mg (75 + 37.5mg) by mouth daily.     Past Surgical History:   Procedure Laterality Date     SECTION      HYSTERECTOMY      PARTIAL HYSTERECTOMY       Family History   Problem Relation Name Age of Onset    No Known Problems Mother      No Known Problems Father         Social History     Tobacco Use    Smoking status: Never    Smokeless tobacco: Never   Substance Use Topics    Alcohol use: Not Currently     Comment: Rarely    Drug use: Never        Physical Exam      Vital Signs Reviewed   /86   Pulse 75   Temp 98.5 °F (36.9 °C) (Oral)   Resp 18   Ht 5' 2" (1.575 m)   Wt 59.9 kg (132 lb)   SpO2 100%   BMI 24.14 kg/m²        Procedures    Procedures     Labs     Results for orders placed or performed in visit on 25   SARS Coronavirus 2 Antigen, POCT Manual Read    Collection Time: 25  6:47 PM   Result Value Ref Range    SARS Coronavirus 2 Antigen Negative Negative, Presumptive Negative     Acceptable Yes      Assessment:     1. Vertigo    2. Dizziness        Plan:   Vertigo is a condition that causes you to feel dizzy. You may feel that you or everything around you is moving or spinning. You may also feel like you are being pulled down or toward your side.  DISCHARGE INSTRUCTIONS:  Seek care immediately if:  You have a headache and a stiff neck.  You have shaking chills and a fever.  You vomit over and over with no relief.  You have blood, pus, or fluid coming out of your ears.  You are confused.  Contact your healthcare provider if:  Your symptoms do not get better with treatment.  You have questions about your condition or care.  Medicines:  Medicine may be given to help relieve your symptoms.  Take your " medicine as directed. Contact your healthcare provider if you think your medicine is not helping or if you have side effects. Tell your provider if you are allergic to any medicine. Keep a list of the medicines, vitamins, and herbs you take. Include the amounts, and when and why you take them. Bring the list or the pill bottles to follow-up visits. Carry your medicine list with you in case of an emergency.  Treatment options  The following list of medications are related to or used in the treatment of this condition.  meclizine  Antivert  Dramamine II  Bonine  Dramamine Less Drowsy  View moretreatment options  Manage your symptoms:  Do not drive , walk without help, or operate heavy machinery when you are dizzy.  Move slowly when you move from one position to another position. Get up slowly from sitting or lying down. Sit or lie down right away if you feel dizzy.  Drink plenty of liquids. Liquids help prevent dehydration. Ask how much liquid to drink each day and which liquids are best for you.  Vestibular and balance rehabilitation therapy (VBRT) is used to teach you exercises to improve your balance and strength. These exercises may help decrease your vertigo and improve your balance. Ask for more information about this therapy.     Vertigo  -     meclizine (ANTIVERT) 25 mg tablet; Take 1 tablet (25 mg total) by mouth 3 (three) times daily as needed for Dizziness or Nausea.  Dispense: 21 tablet; Refill: 0  -     SARS Coronavirus 2 Antigen, POCT Manual Read    Dizziness  -     meclizine (ANTIVERT) 25 mg tablet; Take 1 tablet (25 mg total) by mouth 3 (three) times daily as needed for Dizziness or Nausea.  Dispense: 21 tablet; Refill: 0  -     SARS Coronavirus 2 Antigen, POCT Manual Read

## 2025-02-14 NOTE — PATIENT INSTRUCTIONS
Vertigo is a condition that causes you to feel dizzy. You may feel that you or everything around you is moving or spinning. You may also feel like you are being pulled down or toward your side.  DISCHARGE INSTRUCTIONS:  Seek care immediately if:  You have a headache and a stiff neck.  You have shaking chills and a fever.  You vomit over and over with no relief.  You have blood, pus, or fluid coming out of your ears.  You are confused.  Contact your healthcare provider if:  Your symptoms do not get better with treatment.  You have questions about your condition or care.  Medicines:  Medicine may be given to help relieve your symptoms.  Take your medicine as directed. Contact your healthcare provider if you think your medicine is not helping or if you have side effects. Tell your provider if you are allergic to any medicine. Keep a list of the medicines, vitamins, and herbs you take. Include the amounts, and when and why you take them. Bring the list or the pill bottles to follow-up visits. Carry your medicine list with you in case of an emergency.  Treatment options  The following list of medications are related to or used in the treatment of this condition.  meclizine  Antivert  Dramamine II  Bonine  Dramamine Less Drowsy  View moretreatment options  Manage your symptoms:  Do not drive , walk without help, or operate heavy machinery when you are dizzy.  Move slowly when you move from one position to another position. Get up slowly from sitting or lying down. Sit or lie down right away if you feel dizzy.  Drink plenty of liquids. Liquids help prevent dehydration. Ask how much liquid to drink each day and which liquids are best for you.  Vestibular and balance rehabilitation therapy (VBRT) is used to teach you exercises to improve your balance and strength. These exercises may help decrease your vertigo and improve your balance. Ask for more information about this therapy.

## 2025-02-17 ENCOUNTER — HOSPITAL ENCOUNTER (OUTPATIENT)
Dept: CARDIOLOGY | Facility: HOSPITAL | Age: 35
Discharge: HOME | End: 2025-02-17
Attending: OBSTETRICS & GYNECOLOGY
Payer: COMMERCIAL

## 2025-02-17 DIAGNOSIS — Z01.810 PRE-OPERATIVE CARDIOVASCULAR EXAMINATION: ICD-10-CM

## 2025-02-17 DIAGNOSIS — Z01.810 PRE-OPERATIVE CARDIOVASCULAR EXAMINATION: Primary | ICD-10-CM

## 2025-02-17 PROCEDURE — 93005 ELECTROCARDIOGRAM TRACING: CPT

## 2025-02-19 LAB
OHS QRS DURATION: 76 MS
OHS QTC CALCULATION: 409 MS

## 2025-02-20 ENCOUNTER — LAB VISIT (OUTPATIENT)
Dept: LAB | Facility: HOSPITAL | Age: 35
End: 2025-02-20
Payer: COMMERCIAL

## 2025-02-20 DIAGNOSIS — E16.2 HYPOGLYCEMIA, UNSPECIFIED: Primary | ICD-10-CM

## 2025-02-20 LAB
ALBUMIN SERPL-MCNC: 4 G/DL (ref 3.5–5)
ALBUMIN/GLOB SERPL: 1.3 RATIO (ref 1.1–2)
ALP SERPL-CCNC: 53 UNIT/L (ref 40–150)
ALT SERPL-CCNC: 28 UNIT/L (ref 0–55)
ANION GAP SERPL CALC-SCNC: 4 MEQ/L
AST SERPL-CCNC: 22 UNIT/L (ref 5–34)
BILIRUB SERPL-MCNC: 0.7 MG/DL
BUN SERPL-MCNC: 14.9 MG/DL (ref 7–18.7)
CALCIUM SERPL-MCNC: 9.2 MG/DL (ref 8.4–10.2)
CHLORIDE SERPL-SCNC: 106 MMOL/L (ref 98–107)
CO2 SERPL-SCNC: 30 MMOL/L (ref 22–29)
CORTIS SERPL-SCNC: 9 UG/DL
CREAT SERPL-MCNC: 0.72 MG/DL (ref 0.55–1.02)
CREAT/UREA NIT SERPL: 21
EST. AVERAGE GLUCOSE BLD GHB EST-MCNC: 88.2 MG/DL
GFR SERPLBLD CREATININE-BSD FMLA CKD-EPI: >60 ML/MIN/1.73/M2
GLOBULIN SER-MCNC: 3.1 GM/DL (ref 2.4–3.5)
GLUCOSE SERPL-MCNC: 85 MG/DL (ref 74–100)
HBA1C MFR BLD: 4.7 %
INSULIN SERPL-MCNC: 4 UU/ML
POTASSIUM SERPL-SCNC: 4.2 MMOL/L (ref 3.5–5.1)
PROT SERPL-MCNC: 7.1 GM/DL (ref 6.4–8.3)
SODIUM SERPL-SCNC: 140 MMOL/L (ref 136–145)

## 2025-02-20 PROCEDURE — 83525 ASSAY OF INSULIN: CPT

## 2025-02-20 PROCEDURE — 83036 HEMOGLOBIN GLYCOSYLATED A1C: CPT

## 2025-02-20 PROCEDURE — 36415 COLL VENOUS BLD VENIPUNCTURE: CPT

## 2025-02-20 PROCEDURE — 84681 ASSAY OF C-PEPTIDE: CPT

## 2025-02-20 PROCEDURE — 80053 COMPREHEN METABOLIC PANEL: CPT

## 2025-02-20 PROCEDURE — 82533 TOTAL CORTISOL: CPT

## 2025-02-22 LAB — C PEPTIDE P FAST SERPL-MCNC: 1.4 NG/ML (ref 1.1–4.4)

## 2025-03-14 ENCOUNTER — TELEPHONE (OUTPATIENT)
Dept: BEHAVIORAL HEALTH | Facility: CLINIC | Age: 35
End: 2025-03-14
Payer: COMMERCIAL

## 2025-03-14 NOTE — TELEPHONE ENCOUNTER
LVM informing patient Adderall refill has already been sent to pharmacy and advised she give them a call to have it filled. Also requested pt call back to schedule f/u.    ----- Message from Carol sent at 3/13/2025  3:35 PM CDT -----  Regarding: appointment/refill;  Can you felton this patient for an appointment, the next available is in April and if she does have to wait until April, can she get a courtesy fill to last her. [place] prefers you to felton the patient.

## 2025-03-17 ENCOUNTER — TELEPHONE (OUTPATIENT)
Dept: BEHAVIORAL HEALTH | Facility: CLINIC | Age: 35
End: 2025-03-17
Payer: COMMERCIAL

## 2025-03-17 NOTE — TELEPHONE ENCOUNTER
----- Message from Rose sent at 3/17/2025 12:50 PM CDT -----  Regarding: appt  .Type:  Sooner Apoointment RequestCaller is requesting a sooner appointment.  Caller declined first available appointment listed below.  Caller will not accept being placed on the waitlist and is requesting a message be sent to doctor.Name of Caller:ptWhen is the first available appointment?n/aSymptoms:n/aWould the patient rather a call back or a response via [facility]? [facility] Call Back Number: [phone] Information: request appt

## 2025-03-18 ENCOUNTER — PATIENT MESSAGE (OUTPATIENT)
Dept: BEHAVIORAL HEALTH | Facility: CLINIC | Age: 35
End: 2025-03-18
Payer: COMMERCIAL

## 2025-03-26 ENCOUNTER — OFFICE VISIT (OUTPATIENT)
Dept: BEHAVIORAL HEALTH | Facility: CLINIC | Age: 35
End: 2025-03-26
Payer: COMMERCIAL

## 2025-03-26 DIAGNOSIS — F33.1 MODERATE EPISODE OF RECURRENT MAJOR DEPRESSIVE DISORDER: ICD-10-CM

## 2025-03-26 DIAGNOSIS — F41.1 GAD (GENERALIZED ANXIETY DISORDER): ICD-10-CM

## 2025-03-26 DIAGNOSIS — F90.2 ATTENTION DEFICIT HYPERACTIVITY DISORDER (ADHD), COMBINED TYPE: Primary | ICD-10-CM

## 2025-03-26 RX ORDER — VENLAFAXINE HYDROCHLORIDE 37.5 MG/1
CAPSULE, EXTENDED RELEASE ORAL
Qty: 30 CAPSULE | Refills: 11 | Status: SHIPPED | OUTPATIENT
Start: 2025-03-26

## 2025-03-26 RX ORDER — DEXTROAMPHETAMINE SACCHARATE, AMPHETAMINE ASPARTATE, DEXTROAMPHETAMINE SULFATE AND AMPHETAMINE SULFATE 7.5; 7.5; 7.5; 7.5 MG/1; MG/1; MG/1; MG/1
30 TABLET ORAL 2 TIMES DAILY
Qty: 60 TABLET | Refills: 0 | Status: SHIPPED | OUTPATIENT
Start: 2025-05-17

## 2025-03-26 RX ORDER — DEXTROAMPHETAMINE SACCHARATE, AMPHETAMINE ASPARTATE, DEXTROAMPHETAMINE SULFATE AND AMPHETAMINE SULFATE 7.5; 7.5; 7.5; 7.5 MG/1; MG/1; MG/1; MG/1
30 TABLET ORAL 2 TIMES DAILY
Qty: 60 TABLET | Refills: 0 | Status: SHIPPED | OUTPATIENT
Start: 2025-06-16

## 2025-03-26 RX ORDER — DEXTROAMPHETAMINE SACCHARATE, AMPHETAMINE ASPARTATE, DEXTROAMPHETAMINE SULFATE AND AMPHETAMINE SULFATE 7.5; 7.5; 7.5; 7.5 MG/1; MG/1; MG/1; MG/1
30 TABLET ORAL 2 TIMES DAILY
Qty: 60 TABLET | Refills: 0 | Status: SHIPPED | OUTPATIENT
Start: 2025-04-18

## 2025-03-26 RX ORDER — VENLAFAXINE HYDROCHLORIDE 75 MG/1
CAPSULE, EXTENDED RELEASE ORAL
Qty: 30 CAPSULE | Refills: 5 | Status: SHIPPED | OUTPATIENT
Start: 2025-03-26

## 2025-03-26 NOTE — PROGRESS NOTES
"Outpatient Psychiatry Follow-Up Visit    3/26/2025    Clinical Status of Patient:  Outpatient (Ambulatory)    Chief Complaint:  Nanci Clancy is a 34 y.o. female who presents today for follow-up of depression, anxiety, and attention problems. Patient last seen for follow-up on 1/22025. Met with patient.      TELE PSYCHIATRY Disclaimer   *The patient was informed despite using HIPPA compliant technology there may be risks including security breach, technological failure, inability to perform a comprehensive physical exam which could delay or prevent an accurate diagnosis, and potential complications from treatment decisions rendered over a telemedical platform.   The patient was also informed of the relationship between the physician and patient and the respective role of any other health care provider with respect to management of the patient; and notified that the pt may decline to receive medical services by telemedicine and may withdraw from such care at any time.     Patient's Current location: [address] [place] [address]     In Case of Emergency pts next of kin  Name:Keith Clancy  Phone number:    [phone]   Visit type: Audio only  Total time spent with patient: 24 minutes    Interval History and Content of Current Session:  Interim Events/Subjective Report/Content of Current Session:   Pt reports doing "not great"  overall.  Notes that she's been dealing with illness over the past 2-3 days.  Reports persistent dizziness and vertigo.  Also notes pain with eye movements.  Went to ED and was discharged with referral to neurology.  No focal neurological symptoms.  No benefit from meclizine.  Holding adderall for now due to planned surgical procedure (elective).  Reports variable mood, high anxiety.  Sleep increased, tired on awakening. Appetite good, weight decreased (intentional)_.  Energy low, motivation low.  Endorses irritability, denies hopelessness.  Denies SI/HI/AVH/paranoia, " "denies plan or desire for self harm or harm to others.  Denies SE from current regimen. Pt voices desire to adjust regimen to address ongoing symptoms.      Psychiatric Review of Systems-is patient experiencing or having changes in  See HPI above.     Review of Systems   PSYCHIATRIC: Pertinant items are noted in the narrative.  CONSTITUTIONAL: No weight gain or loss.  MUSCULOSKELETAL: +back pain, denies myalgias  NEUROLOGIC: No weakness, sensory changes, seizures, confusion, memory loss, tremor or other abnormal movements.  +headaches.  +vertigo  CARDIAC: No CP, no palpitations  RESPIRATORY: No shortness of breath.  CARDIOVASCULAR: No tachycardia or chest pain.  GASTROINTESTINAL: No nausea, vomiting, pain, constipation or diarrhea.    Past Medical, Family and Social History: The patient's past medical, family and social history have been reviewed and updated as appropriate within the electronic medical record - see encounter notes.    Compliance: good    Side effects: denies    Risk Parameters:  Patient reports no suicidal ideation  Patient reports no homicidal ideation  Patient reports no self-injurious behavior  Patient reports no violent behavior    Exam (detailed: at least 9 elements; comprehensive: all 15 elements)   Constitutional  Vitals:  Most recent vital signs, dated less than 90 days prior to this appointment, were reviewed.     There were no vitals filed for this visit.       General:   Constitutional: BASIM 2/2 virtual visit    Neurologic:   Motor: BASIM 2/2 virtual visit  Gait: BASIM 2/2 virtual visit    Mental status examination:   Appearance: BASIM 2/2 virtual visit  Behavior: calm and cooperative  Mood: "not great"  Affect: BASIM  Thought process: linear and goal directed  Associations: appropriate for conversation  Thought content: no plan or desire for self harm or harm to others, denies paranoia, no delusional ideation volunteered  Perceptions: denies hallucinations or other altered " perceptions  Orientation: oriented to day of week, month, year, location, and situation  Language: English, fluid  Attention: able to attend to interview  Insight: good  Judgement: good    PHQ9:  Over the last two weeks how often have you been bothered by little interest or pleasure in doing things: 1  Over the last two weeks how often have you been bothered by feeling down, depressed or hopeless: 2  PHQ-2 Total Score: 3  PHQ-9 Score: 15  PHQ-9 Interpretation: Moderately Severe        1/2/2025    11:17 AM 10/2/2024     2:44 PM 2/7/2024     3:04 PM   GAD7   1. Feeling nervous, anxious, or on edge? 3 3 3   2. Not being able to stop or control worrying? 3 3 3   3. Worrying too much about different things? 3 3 3   4. Trouble relaxing? 3 3 3   5. Being so restless that it is hard to sit still? 1 3 3   6. Becoming easily annoyed or irritable? 2 3 3   7. Feeling afraid as if something awful might happen? 0 0 0   8. If you checked off any problems, how difficult have these problems made it for you to do your work, take care of things at home, or get along with other people? 2 2 3   AGATA-7 Score 15 18 18     Assessment and Diagnosis   Status/Progress: Based on the examination today, the patient's problem(s) is/are inadequately controlled.  New problems have been presented today.   Co-morbidities and Lack of compliance are not complicating management of the primary condition.  Number of separate conditions addressed during today's visit: 2 (sleep uncontrolled, ADhD fair control).  Medication management: Yes: Starting a medication, Refilling a prescription, and Deciding to continue a pre-existing prescription.  Are referral(s) being ordered today: No.  Complexity (level) of medical decision making employed in the encounter: MODERATE.    General Impression:    ICD-10-CM ICD-9-CM   1. Attention deficit hyperactivity disorder (ADHD), combined type  F90.2 314.01   2. Moderate episode of recurrent major depressive disorder  F33.1  296.32   3. AGATA (generalized anxiety disorder)  F41.1 300.02     Intervention/Counseling/Treatment Plan   Continue adderall 30mg bid  Continue Effexor XR 150mg daily  Continue ambien 5mg nightly  PDMP reviewed and demonstrated no abnormal filling patterns.   Defer changing regimen for now, consider MS vs autoimmune disease vs inner ear dysfunction (defer workup and management to pcp vs other providers)  No need for PEC as pt is not an imminent danger to self or others or gravely disabled due to acute psychiatric illness  Discussed that pt should either call clinic for psychiatric crisis symptoms or present to nearest emergency room    Discussed with patient informed consent including diagnosis, risks and benefits of proposed treatment above vs. alternative treatments vs. no treatment, as well as serious and common side effects of these treatments, and the inherent unpredictability of individual responses to these treatments. The patient expresses understanding of the above and displays the capacity to agree with this current plan. Patient also agrees that, currently, the benefits outweigh the risks and would like to pursue treatment at this time, and had no other questions.    Instructions:  Take all medications as prescribed.    Abstain from recreational drugs and alcohol.  Present to ED or call 911 for SI/HI plan or intent, psychosis, or medical emergency.    Return to Clinic: Follow up if symptoms worsen or fail to improve.  Given that provider is leaving in the near future, will send list of psychiatry providers to pt's portal account to assist with continuation of care.     Total time:   The total time for services performed on the date of the encounter (including review of prior visit notes, review of notes from other providers, review of results from laboratory/imaging studies, face-to-face time with patient, and time spent on other activities directly related to patient care): 24 minutes.    Mqiuel Be  MD [facility]

## 2025-04-13 ENCOUNTER — NURSE TRIAGE (OUTPATIENT)
Dept: ADMINISTRATIVE | Facility: CLINIC | Age: 35
End: 2025-04-13
Payer: COMMERCIAL

## 2025-04-13 NOTE — TELEPHONE ENCOUNTER
"Pt states "feel really weird." Accuc[place]k 57, "weird headache, chest feels cold." Pt states just had steak and potatoes approx 1 hour ago. Pt with  at this time. Advised pt to eat Jolly Rancher candy. Pt states has been referred to tony Gleason in July. During call, Recheck of Accuchek 78. Pt states symptoms are improving. Per protocol, home care advised. Discussed treatment, symptoms to monitor for and advised to call back for any further questions or concerns.   Reason for Disposition   [1] Blood glucose 70 mg/dL (3.9 mmol/L) or below, OR symptomatic AND [2] cause known    Additional Information   Negative: Unconscious or difficult to awaken   Negative: Seizure occurs   Negative: Acting confused (e.g., disoriented, slurred speech)   Negative: Very weak (e.g., can't stand)   Negative: Sounds like a life-threatening emergency to the triager   Negative: [1] Vomiting AND [2] signs of dehydration (e.g., very dry mouth, lightheaded, dark urine)   Negative: [1] Low blood sugar symptoms persist > 30 minutes AND [2] using low blood sugar Care Advice   Negative: [1] Low blood glucose (70 mg/dL [3.9 mmol/L] or below) persists > 30 minutes AND [2] using low blood sugar Care Advice   Negative: Patient sounds very sick or weak to the triager   Negative: [1] Low blood sugar symptoms with no other adult present AND [2] hasn't tried Care Advice   Negative: [1] Low blood glucose (70 mg/dL [3.9 mmol/L] or below)) with no other adult present AND [2] hasn't tried Care Advice   Negative: Diabetes drug error or overdose (e.g., insulin error or extra dose)   Negative: [1] Caller has URGENT medication or insulin device (e.g., pump, continuous monitoring) question AND [2] triager unable to answer question   Negative: [1] Blood glucose 70  mg/dL (3.9 mmol/L) or below OR symptomatic, now improved with Care Advice AND [2] cause unknown   Negative: [1] Caller has NON-URGENT medication or insulin device (e.g., pump, continuous monitoring) " question AND [2] triager unable to answer question   Negative: [1] Morning (before breakfast) blood glucose < 80 mg/dL (4.4 mmol/L) AND [2] more than once in past week   Negative: [1] Evening (after bedtime snack) blood glucose < 100 mg/dL (5.6 mmol/L) AND [2] more than once in past week    Protocols used: Diabetes - Low Blood Sugar-A-AH

## 2025-04-26 ENCOUNTER — NURSE TRIAGE (OUTPATIENT)
Dept: ADMINISTRATIVE | Facility: CLINIC | Age: 35
End: 2025-04-26
Payer: COMMERCIAL

## 2025-04-26 NOTE — TELEPHONE ENCOUNTER
"Reason for Disposition   Headache    Additional Information   Negative: Difficult to awaken or acting confused (e.g., disoriented, slurred speech)   Negative: [1] Weakness of the face, arm or leg on one side of the body AND [2] new-onset   Negative: [1] Numbness of the face, arm or leg on one side of the body AND [2] new-onset   Negative: [1] Loss of speech or garbled speech AND [2] new-onset   Negative: Passed out (e.g., fainted, lost consciousness, blacked out and was not responding)   Negative: Sounds like a life-threatening emergency to the triager   Negative: Followed a head injury   Negative: Pregnant   Negative: Postpartum (from 0 to 6 weeks after delivery)   Negative: Unable to walk, or can only walk with assistance (e.g., requires support)   Negative: Stiff neck (can't touch chin to chest)   Negative: Severe pain in one eye   Negative: [1] Other family members (or people in same household) with headaches AND [2] possibility of carbon monoxide exposure   Negative: [1] SEVERE headache (e.g., excruciating) AND [2] "worst headache" of life   Negative: [1] SEVERE headache AND [2] sudden-onset (i.e., reaching maximum intensity within seconds to 1 hour)   Negative: [1] SEVERE headache AND [2] fever   Negative: Loss of vision or double vision  (Exception: Same as previously diagnosed migraines.)   Negative: [1] Fever > 100 F (37.8 C) AND [2] diabetes mellitus or weak immune system (e.g., HIV positive, cancer chemo, splenectomy, organ transplant, chronic steroids)   Negative: Patient sounds very sick or weak to the triager   Negative: [1] SEVERE headache (e.g., excruciating) AND [2] not improved after 2 hours of pain medicine   Negative: [1] Vomiting AND [2] 2 or more times  (Exception: Similar to previously diagnosed migraines.)   Negative: Fever > 104 F (40 C)   Negative: [1] MODERATE headache (e.g., interferes with normal activities) AND [2] present > 24 hours AND [3] unexplained  (Exceptions: Pain medicines not " tried, typical migraine, or headache part of viral illness.)   Negative: Fever present > 3 days (72 hours)   Negative: [1] New-onset headache AND [2] weak immune system (e.g., HIV positive, cancer chemo, splenectomy, organ transplant, chronic steroids)   Negative: [1] MILD-MODERATE headache AND [2] present > 3 days (72 hours) AND [3] no improvement after using Care Advice   Negative: [1] New-onset headache AND [2] age > 50 years   Negative: Headache started during exertion (e.g., sex, strenuous exercise, heavy lifting)   Negative: Headache is a chronic symptom (recurrent or ongoing AND present > 4 weeks)    Protocols used: Headache-A-AH  Pt states she has been having high and low blood sugar and will see her endocrinologist this week. States her blood sugar is high and she wants to know how to lower it. Pt states her blood sugar is 126. Advised pt her blood sugar is normal. Pt states she has a headache. Advised per the Triage care advice. Instructed to call [facility] back if symptoms develop or becomes worse. Pt verbalized understanding.

## 2025-05-01 DIAGNOSIS — F33.1 MODERATE EPISODE OF RECURRENT MAJOR DEPRESSIVE DISORDER: ICD-10-CM

## 2025-05-01 DIAGNOSIS — F90.2 ATTENTION DEFICIT HYPERACTIVITY DISORDER (ADHD), COMBINED TYPE: ICD-10-CM

## 2025-05-02 RX ORDER — DEXTROAMPHETAMINE SACCHARATE, AMPHETAMINE ASPARTATE, DEXTROAMPHETAMINE SULFATE AND AMPHETAMINE SULFATE 7.5; 7.5; 7.5; 7.5 MG/1; MG/1; MG/1; MG/1
30 TABLET ORAL 2 TIMES DAILY
Qty: 60 TABLET | Refills: 0 | Status: SHIPPED | OUTPATIENT
Start: 2025-05-02

## 2025-05-02 NOTE — TELEPHONE ENCOUNTER
Received refill request for adderall.  PDMP reviewed and demonstrated no abnormal filling patterns.  Refill submitted as requested.

## 2025-06-06 ENCOUNTER — PATIENT MESSAGE (OUTPATIENT)
Dept: BEHAVIORAL HEALTH | Facility: CLINIC | Age: 35
End: 2025-06-06
Payer: COMMERCIAL

## 2025-06-12 ENCOUNTER — OFFICE VISIT (OUTPATIENT)
Dept: BEHAVIORAL HEALTH | Facility: CLINIC | Age: 35
End: 2025-06-12
Payer: COMMERCIAL

## 2025-06-12 VITALS
WEIGHT: 134.88 LBS | SYSTOLIC BLOOD PRESSURE: 117 MMHG | DIASTOLIC BLOOD PRESSURE: 81 MMHG | HEIGHT: 62 IN | BODY MASS INDEX: 24.82 KG/M2

## 2025-06-12 DIAGNOSIS — G47.00 INSOMNIA, UNSPECIFIED TYPE: ICD-10-CM

## 2025-06-12 DIAGNOSIS — F34.1 PERSISTENT DEPRESSIVE DISORDER: ICD-10-CM

## 2025-06-12 DIAGNOSIS — R63.2 BINGE EATING: Chronic | ICD-10-CM

## 2025-06-12 DIAGNOSIS — F33.1 MODERATE EPISODE OF RECURRENT MAJOR DEPRESSIVE DISORDER: Primary | ICD-10-CM

## 2025-06-12 DIAGNOSIS — F90.2 ATTENTION DEFICIT HYPERACTIVITY DISORDER (ADHD), COMBINED TYPE: ICD-10-CM

## 2025-06-12 DIAGNOSIS — F41.1 GAD (GENERALIZED ANXIETY DISORDER): ICD-10-CM

## 2025-06-12 DIAGNOSIS — F31.4 BIPOLAR DISORDER, CURRENT EPISODE DEPRESSED, SEVERE, WITHOUT PSYCHOTIC FEATURES: ICD-10-CM

## 2025-06-12 PROCEDURE — 99213 OFFICE O/P EST LOW 20 MIN: CPT | Mod: PBBFAC,PN | Performed by: NURSE PRACTITIONER

## 2025-06-12 RX ORDER — LAMOTRIGINE 25 MG/1
25 TABLET ORAL DAILY
Qty: 30 TABLET | Refills: 3 | Status: SHIPPED | OUTPATIENT
Start: 2025-06-12

## 2025-06-12 RX ORDER — ESZOPICLONE 1 MG/1
1 TABLET, FILM COATED ORAL NIGHTLY
Qty: 30 TABLET | Refills: 3 | Status: SHIPPED | OUTPATIENT
Start: 2025-06-12

## 2025-06-12 RX ORDER — VENLAFAXINE HYDROCHLORIDE 75 MG/1
75 CAPSULE, EXTENDED RELEASE ORAL DAILY
Qty: 30 CAPSULE | Refills: 3 | Status: SHIPPED | OUTPATIENT
Start: 2025-06-12

## 2025-06-12 NOTE — PROGRESS NOTES
Initial Visit 06/12/2025  HPI: Nanci Clancy is a 34 y.o. female here today for a psychiatric evaluation referred by PCP to the [facility] for   Past Medical History:     No past medical history on file.     Medications:   Current Outpatient Medications   Medication Instructions    clindamycin (CLEOCIN T) 1 % Swab Topical (Top), Daily    dextroamphetamine-amphetamine (ADDERALL) 30 mg Tab 30 mg, Oral, 2 times daily    [START ON 6/16/2025] dextroamphetamine-amphetamine (ADDERALL) 30 mg Tab 30 mg, Oral, 2 times daily    dextroamphetamine-amphetamine (ADDERALL) 30 mg Tab 30 mg, Oral, 2 times daily    eszopiclone (LUNESTA) 1 mg, Oral, Nightly    gabapentin (NEURONTIN) 300 mg, Oral, 3 times daily PRN    lamoTRIgine (LAMICTAL) 25 mg, Oral, Daily    meclizine (ANTIVERT) 25 mg, Oral, 3 times daily PRN    tretinoin (RETIN-A) 0.025 % cream Topical (Top), Nightly    venlafaxine (EFFEXOR-XR) 75 mg, Oral, Daily   Medication Hx  Ambien  Wellbutrin  Celexa  Cymbalta  Lexapro - SE    Vyvanse - lack of benefit    Dexedrine    Abilify - SE  Auvelity - SE    Last visit:  Patient was last seen by Dr. Jaimes on 03/26/2025.  At that time she was to continue Effexor  mg a day, Adderall IR 30 mg twice a day, and Ambien 5 mg at HS.  He was not going to make any medication changes. He was considering MS vs autoimmune disease vs inner ear dysfunction and was deferring workup and management to pcp vs other providers.    This visit:  This provider is assuming care of patient from Dr. Jaimes.  Today, patient states that for depression, sleep, and energy levels are worse and that her anxiety is the same.  She states that she is still taking all of her medications as prescribed.  She feels that she might need some medication changes.  She states that her energy has been extremely low.  After work she finds herself in bed as well as on her days off.  She states that she has no self-esteem she is eating all the time and  gaining weight which she thinks might also be due to her depression.    She states that she and Dr. Be have tried numerous meds and have failed. She states that she got close to feeling better with the Effexor.     She is still taking the Effexor XR 150mg a day. She has never been on more than 150mg.   She is still taking Adderall 30mg IR BID.  She states that she stopped taking Ambien 5mg at HS several months ago.     Patient and this provider began to discuss medications.    The interview is somewhat confusing.   In March 2025, patient went to the ER with complaints of dizziness and severe headache over the last several days. At that time, she reported that at its worst, she has difficulty with speech and finding appropriate words. She reported having these episodes over the last 3 months. She was to follow-up with Dr. Cervantes (neurology).   Patient states that since then, she was found to have low blood sugar. She now has a glucose monitor and she is no longer having any of these episodes.   She does not plan on following up with Billy. Encouraged her to follow-up with neurology to rule out any neurological disorder.     She states that even though her glucose levels are back within normal limits, she still have very low energy.     She has never had a sleep study.   She states that she does not sleep well. She states that her thoughts wake in the middle of the night.    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 Her job is stressful.   She has been doing this for 3 years. She loves it but it is stressful.     She has two teen age girls and a .     In January, patient complained of acne and her hair falling out. The Effexor was decreased from 150 to 112.5 and then to 37.5mg. Patient states that there has been no difference in her skin or hair. She does not believe that it was due to the medication. Will increase the Effexor XR back to 75mg a  day. If her skin and hair do not worsen, and she starts to feel better, will increase back to 150mg a day.     Consider Mood Disorder such as Bipolar II.   Start Lamictal 25mg a day     She is also binge eating. Consider Naltrexone in the future.    PHQ Score:   06/12/2025: 23 severe    AGATA-7 Score:   06/12/2025: 17 severe      Mental Status Evaluation:  Appearance:  unremarkable, age appropriate   Behavior:  normal, cooperative   Speech:  no latency; no press   Mood:  depressed   Affect:  congruent and appropriate   Thought Process:  normal and logical   Thought Content:  normal, no suicidality, no homicidality, delusions, or paranoia   Sensorium:  grossly intact   Cognition:  grossly intact   Insight:  intact   Judgment:  behavior is adequate to circumstances     Impression:      ICD-10-CM ICD-9-CM   1. Moderate episode of recurrent major depressive disorder  F33.1 296.32   2. AGATA (generalized anxiety disorder)  F41.1 300.02   3. Attention deficit hyperactivity disorder (ADHD), combined type  F90.2 314.01   4. Insomnia, unspecified type  G47.00 780.52   5. Persistent depressive disorder  F34.1 300.4   6. Bipolar disorder, current episode depressed, severe, without psychotic features  F31.4 296.53   7. Binge eating  R63.2 783.6      Plan:  1. Continue Adderall 30mg bid  2. Increase Effexor XR to 75mg daily and then consider increasing again to 150mg  3. Start Lamictal 25mg a day  4. Start Lunesta 1mg     PDMP reviewed and demonstrated no abnormal filling patterns.     Defer changing regimen for now, consider MS vs autoimmune disease vs inner ear dysfunction (defer workup and management to pcp vs other providers)    No need for PEC as pt is not an imminent danger to self or others or gravely disabled due to acute psychiatric illness    Discussed that pt should either call clinic for psychiatric crisis symptoms or present to nearest emergency room     Discussed with patient informed consent including diagnosis, risks  and benefits of proposed treatment above vs. alternative treatments vs. no treatment, as well as serious and common side effects of these treatments, and the inherent unpredictability of individual responses to these treatments. The patient expresses understanding of the above and displays the capacity to agree with this current plan. Patient also agrees that, currently, the benefits outweigh the risks and would like to pursue treatment at this time, and had no other questions.     Instructions:  Take all medications as prescribed.    2. Abstain from recreational drugs and alcohol.  3. Present to ED or call 911 for SI/HI plan or intent, psychosis, or medical emergency.     Return to Clinic: Follow up in about 3 months (around 9/12/2025) for medication management, face to face.      Outpatient Psychiatry Follow-Up Visit 03/26/2025  Clinical Status of Patient:  Outpatient (Ambulatory)  Chief Complaint:  Nanci Clancy is a 34 y.o. female who presents today for follow-up of depression, anxiety, and attention problems. Patient last seen for follow-up on 1/22025. Met with patient.       TELE PSYCHIATRY Disclaimer   *The patient was informed despite using HIPPA compliant technology there may be risks including security breach, technological failure, inability to perform a comprehensive physical exam which could delay or prevent an accurate diagnosis, and potential complications from treatment decisions rendered over a telemedical platform.   The patient was also informed of the relationship between the physician and patient and the respective role of any other health care provider with respect to management of the patient; and notified that the pt may decline to receive medical services by telemedicine and may withdraw from such care at any time.      Patient's Current location: [address] [place], [place] [id number]     In Case of Emergency pts next of kin  Name:Keith Clancy  Phone number:   "  [phone]   Visit type: Audio only  Total time spent with patient: 24 minutes     Interval History and Content of Current Session:  Interim Events/Subjective Report/Content of Current Session:   Pt reports doing "not great"  overall.  Notes that she's been dealing with illness over the past 2-3 days.  Reports persistent dizziness and vertigo.  Also notes pain with eye movements.  Went to ED and was discharged with referral to neurology.  No focal neurological symptoms.  No benefit from meclizine.  Holding adderall for now due to planned surgical procedure (elective).  Reports variable mood, high anxiety.  Sleep increased, tired on awakening. Appetite good, weight decreased (intentional)_.  Energy low, motivation low.  Endorses irritability, denies hopelessness.  Denies SI/HI/AVH/paranoia, denies plan or desire for self harm or harm to others.  Denies SE from current regimen. Pt voices desire to adjust regimen to address ongoing symptoms.       Psychiatric Review of Systems-is patient experiencing or having changes in  See HPI above.      Review of Systems   PSYCHIATRIC: Pertinant items are noted in the narrative.  CONSTITUTIONAL: No weight gain or loss.  MUSCULOSKELETAL: +back pain, denies myalgias  NEUROLOGIC: No weakness, sensory changes, seizures, confusion, memory loss, tremor or other abnormal movements.  +headaches.  +vertigo  CARDIAC: No CP, no palpitations  RESPIRATORY: No shortness of breath.  CARDIOVASCULAR: No tachycardia or chest pain.  GASTROINTESTINAL: No nausea, vomiting, pain, constipation or diarrhea.     Past Medical, Family and Social History: The patient's past medical, family and social history have been reviewed and updated as appropriate within the electronic medical record - see encounter notes.     Compliance: good     Side effects: denies     Risk Parameters:  Patient reports no suicidal ideation  Patient reports no homicidal ideation  Patient reports no self-injurious " "behavior  Patient reports no violent behavior     Exam (detailed: at least 9 elements; comprehensive: all 15 elements)   Constitutional  Vitals:  Most recent vital signs, dated less than 90 days prior to this appointment, were reviewed.      There were no vitals filed for this visit.         General:   Constitutional: [facility] 2/2 virtual visit     Neurologic:   Motor: [facility] 2/2 virtual visit  Gait: [facility] 2/2 virtual visit     Mental status examination:   Appearance: [facility] 2/2 virtual visit  Behavior: calm and cooperative  Mood: "not great"  Affect: [facility]  Thought process: linear and goal directed  Associations: appropriate for conversation  Thought content: no plan or desire for self harm or harm to others, denies paranoia, no delusional ideation volunteered  Perceptions: denies hallucinations or other altered perceptions  Orientation: oriented to day of week, month, year, location, and situation  Language: English, fluid  Attention: able to attend to interview  Insight: good  Judgement: good     PHQ9:  Over the last two weeks how often have you been bothered by little interest or pleasure in doing things: 1  Over the last two weeks how often have you been bothered by feeling down, depressed or hopeless: 2  PHQ-2 Total Score: 3  PHQ-9 Score: 15  PHQ-9 Interpretation: Moderately Severe          1/2/2025    11:17 AM 10/2/2024     2:44 PM 2/7/2024     3:04 PM   GAD7   1. Feeling nervous, anxious, or on edge? 3 3 3   2. Not being able to stop or control worrying? 3 3 3   3. Worrying too much about different things? 3 3 3   4. Trouble relaxing? 3 3 3   5. Being so restless that it is hard to sit still? 1 3 3   6. Becoming easily annoyed or irritable? 2 3 3   7. Feeling afraid as if something awful might happen? 0 0 0   8. If you checked off any problems, how difficult have these problems made it for you to do your work, take care of things at home, or get along with other people? 2 2 3   AGATA-7 Score 15 18 18      Assessment and " Diagnosis   Status/Progress: Based on the examination today, the patient's problem(s) is/are inadequately controlled.  New problems have been presented today.   Co-morbidities and Lack of compliance are not complicating management of the primary condition.  Number of separate conditions addressed during today's visit: 2 (sleep uncontrolled, ADhD fair control).  Medication management: Yes: Starting a medication, Refilling a prescription, and Deciding to continue a pre-existing prescription.  Are referral(s) being ordered today: No.  Complexity (level) of medical decision making employed in the encounter: MODERATE.     General Impression:      ICD-10-CM ICD-9-CM   1. Attention deficit hyperactivity disorder (ADHD), combined type  F90.2 314.01   2. Moderate episode of recurrent major depressive disorder  F33.1 296.32   3. AGATA (generalized anxiety disorder)  F41.1 300.02      Intervention/Counseling/Treatment Plan   Continue adderall 30mg bid  Continue Effexor XR 150mg daily  Continue ambien 5mg nightly  PDMP reviewed and demonstrated no abnormal filling patterns.   Defer changing regimen for now, consider MS vs autoimmune disease vs inner ear dysfunction (defer workup and management to pcp vs other providers)  No need for PEC as pt is not an imminent danger to self or others or gravely disabled due to acute psychiatric illness  Discussed that pt should either call clinic for psychiatric crisis symptoms or present to nearest emergency room     Discussed with patient informed consent including diagnosis, risks and benefits of proposed treatment above vs. alternative treatments vs. no treatment, as well as serious and common side effects of these treatments, and the inherent unpredictability of individual responses to these treatments. The patient expresses understanding of the above and displays the capacity to agree with this current plan. Patient also agrees that, currently, the benefits outweigh the risks and would  like to pursue treatment at this time, and had no other questions.     Instructions:  Take all medications as prescribed.    Abstain from recreational drugs and alcohol.  Present to ED or call 911 for SI/HI plan or intent, psychosis, or medical emergency.     Return to Clinic: Follow up if symptoms worsen or fail to improve.  Given that provider is leaving in the near future, will send list of psychiatry providers to pt's portal account to assist with continuation of care.      Total time:   The total time for services performed on the date of the encounter (including review of prior visit notes, review of notes from other providers, review of results from laboratory/imaging studies, face-to-face time with patient, and time spent on other activities directly related to patient care): 24 minutes.     Miquel Be MD  [facility]

## 2025-06-18 DIAGNOSIS — F90.2 ATTENTION DEFICIT HYPERACTIVITY DISORDER (ADHD), COMBINED TYPE: ICD-10-CM

## 2025-06-18 DIAGNOSIS — F33.1 MODERATE EPISODE OF RECURRENT MAJOR DEPRESSIVE DISORDER: ICD-10-CM

## 2025-06-18 NOTE — TELEPHONE ENCOUNTER
Please see attached refill request.    Next scheduled office visit: 8/20/2025.    Last office visit: 6/12/2025.     Thank you!

## 2025-06-19 RX ORDER — DEXTROAMPHETAMINE SACCHARATE, AMPHETAMINE ASPARTATE, DEXTROAMPHETAMINE SULFATE AND AMPHETAMINE SULFATE 7.5; 7.5; 7.5; 7.5 MG/1; MG/1; MG/1; MG/1
30 TABLET ORAL 2 TIMES DAILY
Qty: 60 TABLET | Refills: 0 | OUTPATIENT
Start: 2025-06-19

## 2025-06-19 RX ORDER — DEXTROAMPHETAMINE SACCHARATE, AMPHETAMINE ASPARTATE, DEXTROAMPHETAMINE SULFATE AND AMPHETAMINE SULFATE 7.5; 7.5; 7.5; 7.5 MG/1; MG/1; MG/1; MG/1
30 TABLET ORAL 2 TIMES DAILY
Qty: 64 TABLET | Refills: 0 | Status: SHIPPED | OUTPATIENT
Start: 2025-07-19 | End: 2025-08-20

## 2025-06-19 RX ORDER — DEXTROAMPHETAMINE SACCHARATE, AMPHETAMINE ASPARTATE, DEXTROAMPHETAMINE SULFATE AND AMPHETAMINE SULFATE 7.5; 7.5; 7.5; 7.5 MG/1; MG/1; MG/1; MG/1
30 TABLET ORAL 2 TIMES DAILY
Qty: 60 TABLET | Refills: 0 | Status: SHIPPED | OUTPATIENT
Start: 2025-06-19 | End: 2025-08-18

## 2025-07-01 ENCOUNTER — PATIENT MESSAGE (OUTPATIENT)
Dept: BEHAVIORAL HEALTH | Facility: CLINIC | Age: 35
End: 2025-07-01
Payer: COMMERCIAL

## 2025-08-18 ENCOUNTER — TELEPHONE (OUTPATIENT)
Dept: RADIOLOGY | Facility: HOSPITAL | Age: 35
End: 2025-08-18
Payer: COMMERCIAL

## 2025-08-20 ENCOUNTER — OFFICE VISIT (OUTPATIENT)
Dept: BEHAVIORAL HEALTH | Facility: CLINIC | Age: 35
End: 2025-08-20
Payer: COMMERCIAL

## 2025-08-20 VITALS
BODY MASS INDEX: 22.7 KG/M2 | DIASTOLIC BLOOD PRESSURE: 76 MMHG | HEIGHT: 62 IN | WEIGHT: 123.38 LBS | SYSTOLIC BLOOD PRESSURE: 110 MMHG

## 2025-08-20 DIAGNOSIS — F31.4 BIPOLAR DISORDER, CURRENT EPISODE DEPRESSED, SEVERE, WITHOUT PSYCHOTIC FEATURES: Primary | ICD-10-CM

## 2025-08-20 DIAGNOSIS — F41.1 GAD (GENERALIZED ANXIETY DISORDER): ICD-10-CM

## 2025-08-20 DIAGNOSIS — F33.1 MODERATE EPISODE OF RECURRENT MAJOR DEPRESSIVE DISORDER: ICD-10-CM

## 2025-08-20 DIAGNOSIS — F90.2 ATTENTION DEFICIT HYPERACTIVITY DISORDER (ADHD), COMBINED TYPE: ICD-10-CM

## 2025-08-20 PROBLEM — F34.0 CYCLOTHYMIA: Chronic | Status: ACTIVE | Noted: 2025-08-20

## 2025-08-20 PROCEDURE — 99213 OFFICE O/P EST LOW 20 MIN: CPT | Mod: PBBFAC,PN | Performed by: NURSE PRACTITIONER

## 2025-08-20 RX ORDER — DEXTROAMPHETAMINE SACCHARATE, AMPHETAMINE ASPARTATE, DEXTROAMPHETAMINE SULFATE AND AMPHETAMINE SULFATE 7.5; 7.5; 7.5; 7.5 MG/1; MG/1; MG/1; MG/1
30 TABLET ORAL 2 TIMES DAILY
Qty: 60 TABLET | Refills: 0 | Status: SHIPPED | OUTPATIENT
Start: 2025-09-01 | End: 2025-10-01

## 2025-08-20 RX ORDER — LAMOTRIGINE 25 MG/1
50 TABLET ORAL DAILY
Qty: 60 TABLET | Refills: 3 | Status: SHIPPED | OUTPATIENT
Start: 2025-08-20

## 2025-08-20 RX ORDER — VENLAFAXINE HYDROCHLORIDE 75 MG/1
75 CAPSULE, EXTENDED RELEASE ORAL DAILY
Qty: 30 CAPSULE | Refills: 3 | Status: SHIPPED | OUTPATIENT
Start: 2025-08-20

## 2025-08-20 RX ORDER — DEXTROAMPHETAMINE SACCHARATE, AMPHETAMINE ASPARTATE, DEXTROAMPHETAMINE SULFATE AND AMPHETAMINE SULFATE 7.5; 7.5; 7.5; 7.5 MG/1; MG/1; MG/1; MG/1
30 TABLET ORAL 2 TIMES DAILY
Qty: 60 TABLET | Refills: 0 | Status: SHIPPED | OUTPATIENT
Start: 2025-09-02 | End: 2025-10-02

## 2025-08-20 RX ORDER — DEXTROAMPHETAMINE SACCHARATE, AMPHETAMINE ASPARTATE, DEXTROAMPHETAMINE SULFATE AND AMPHETAMINE SULFATE 7.5; 7.5; 7.5; 7.5 MG/1; MG/1; MG/1; MG/1
30 TABLET ORAL 2 TIMES DAILY
Qty: 60 TABLET | Refills: 0 | Status: SHIPPED | OUTPATIENT
Start: 2025-10-31 | End: 2025-11-30